# Patient Record
Sex: FEMALE | Race: WHITE | NOT HISPANIC OR LATINO | Employment: UNEMPLOYED | URBAN - METROPOLITAN AREA
[De-identification: names, ages, dates, MRNs, and addresses within clinical notes are randomized per-mention and may not be internally consistent; named-entity substitution may affect disease eponyms.]

---

## 2017-06-14 ENCOUNTER — HOSPITAL ENCOUNTER (INPATIENT)
Facility: MEDICAL CENTER | Age: 32
LOS: 2 days | End: 2017-06-16
Attending: SPECIALIST | Admitting: SPECIALIST
Payer: MEDICAID

## 2017-06-14 LAB
ABO GROUP BLD: NORMAL
ACTION RH IMMUNE GLOB 8505RHG: NORMAL
BASOPHILS # BLD AUTO: 0.6 % (ref 0–1.8)
BASOPHILS # BLD: 0.07 K/UL (ref 0–0.12)
BLD GP AB SCN SERPL QL: NORMAL
EOSINOPHIL # BLD AUTO: 0.08 K/UL (ref 0–0.51)
EOSINOPHIL NFR BLD: 0.6 % (ref 0–6.9)
ERYTHROCYTE [DISTWIDTH] IN BLOOD BY AUTOMATED COUNT: 40.3 FL (ref 35.9–50)
ERYTHROCYTE [DISTWIDTH] IN BLOOD BY AUTOMATED COUNT: 40.6 FL (ref 35.9–50)
HBV SURFACE AG SER QL: NEGATIVE
HCT VFR BLD AUTO: 35.9 % (ref 37–47)
HCT VFR BLD AUTO: 37.5 % (ref 37–47)
HCV AB SER QL: REACTIVE
HGB BLD-MCNC: 12.5 G/DL (ref 12–16)
HGB BLD-MCNC: 12.8 G/DL (ref 12–16)
HIV 1+2 AB+HIV1 P24 AG SERPL QL IA: NON REACTIVE
IMM GRANULOCYTES # BLD AUTO: 0.04 K/UL (ref 0–0.11)
IMM GRANULOCYTES NFR BLD AUTO: 0.3 % (ref 0–0.9)
IMMUNE ROSETTING TEST 8505FMH: NORMAL
LYMPHOCYTES # BLD AUTO: 2.46 K/UL (ref 1–4.8)
LYMPHOCYTES NFR BLD: 19.3 % (ref 22–41)
MCH RBC QN AUTO: 31.4 PG (ref 27–33)
MCH RBC QN AUTO: 31.8 PG (ref 27–33)
MCHC RBC AUTO-ENTMCNC: 34.1 G/DL (ref 33.6–35)
MCHC RBC AUTO-ENTMCNC: 34.8 G/DL (ref 33.6–35)
MCV RBC AUTO: 91.3 FL (ref 81.4–97.8)
MCV RBC AUTO: 91.9 FL (ref 81.4–97.8)
MONOCYTES # BLD AUTO: 0.85 K/UL (ref 0–0.85)
MONOCYTES NFR BLD AUTO: 6.7 % (ref 0–13.4)
NEUTROPHILS # BLD AUTO: 9.22 K/UL (ref 2–7.15)
NEUTROPHILS NFR BLD: 72.5 % (ref 44–72)
NRBC # BLD AUTO: 0.02 K/UL
NRBC BLD AUTO-RTO: 0.2 /100 WBC
NUMBER OF RH DOSES IND 8505RD: 1
PLATELET # BLD AUTO: 172 K/UL (ref 164–446)
PLATELET # BLD AUTO: 185 K/UL (ref 164–446)
PMV BLD AUTO: 11.4 FL (ref 9–12.9)
PMV BLD AUTO: 11.4 FL (ref 9–12.9)
RBC # BLD AUTO: 3.93 M/UL (ref 4.2–5.4)
RBC # BLD AUTO: 4.08 M/UL (ref 4.2–5.4)
RH BLD: NORMAL
RUBV AB SER QL: 59.4 IU/ML
TREPONEMA PALLIDUM IGG+IGM AB [PRESENCE] IN SERUM OR PLASMA BY IMMUNOASSAY: NON REACTIVE
WBC # BLD AUTO: 12.7 K/UL (ref 4.8–10.8)
WBC # BLD AUTO: 14.6 K/UL (ref 4.8–10.8)

## 2017-06-14 PROCEDURE — 86901 BLOOD TYPING SEROLOGIC RH(D): CPT

## 2017-06-14 PROCEDURE — 3E0234Z INTRODUCTION OF SERUM, TOXOID AND VACCINE INTO MUSCLE, PERCUTANEOUS APPROACH: ICD-10-PCS | Performed by: SPECIALIST

## 2017-06-14 PROCEDURE — 700102 HCHG RX REV CODE 250 W/ 637 OVERRIDE(OP): Performed by: SPECIALIST

## 2017-06-14 PROCEDURE — 770002 HCHG ROOM/CARE - OB PRIVATE (112)

## 2017-06-14 PROCEDURE — 700101 HCHG RX REV CODE 250: Performed by: SPECIALIST

## 2017-06-14 PROCEDURE — A9270 NON-COVERED ITEM OR SERVICE: HCPCS | Performed by: SPECIALIST

## 2017-06-14 PROCEDURE — 700112 HCHG RX REV CODE 229: Performed by: SPECIALIST

## 2017-06-14 PROCEDURE — 304965 HCHG RECOVERY SERVICES

## 2017-06-14 PROCEDURE — 86850 RBC ANTIBODY SCREEN: CPT

## 2017-06-14 PROCEDURE — 86780 TREPONEMA PALLIDUM: CPT

## 2017-06-14 PROCEDURE — 700111 HCHG RX REV CODE 636 W/ 250 OVERRIDE (IP): Performed by: SPECIALIST

## 2017-06-14 PROCEDURE — 0HQ9XZZ REPAIR PERINEUM SKIN, EXTERNAL APPROACH: ICD-10-PCS | Performed by: SPECIALIST

## 2017-06-14 PROCEDURE — 87340 HEPATITIS B SURFACE AG IA: CPT

## 2017-06-14 PROCEDURE — 87522 HEPATITIS C REVRS TRNSCRPJ: CPT

## 2017-06-14 PROCEDURE — 85027 COMPLETE CBC AUTOMATED: CPT

## 2017-06-14 PROCEDURE — 59409 OBSTETRICAL CARE: CPT

## 2017-06-14 PROCEDURE — 700105 HCHG RX REV CODE 258

## 2017-06-14 PROCEDURE — 90471 IMMUNIZATION ADMIN: CPT

## 2017-06-14 PROCEDURE — 303615 HCHG EPIDURAL/SPINAL ANESTHESIA FOR LABOR

## 2017-06-14 PROCEDURE — 86803 HEPATITIS C AB TEST: CPT

## 2017-06-14 PROCEDURE — 700111 HCHG RX REV CODE 636 W/ 250 OVERRIDE (IP)

## 2017-06-14 PROCEDURE — 700112 HCHG RX REV CODE 229

## 2017-06-14 PROCEDURE — 86762 RUBELLA ANTIBODY: CPT

## 2017-06-14 PROCEDURE — 87389 HIV-1 AG W/HIV-1&-2 AB AG IA: CPT

## 2017-06-14 PROCEDURE — 85025 COMPLETE CBC W/AUTO DIFF WBC: CPT

## 2017-06-14 PROCEDURE — 86900 BLOOD TYPING SEROLOGIC ABO: CPT

## 2017-06-14 PROCEDURE — 90715 TDAP VACCINE 7 YRS/> IM: CPT

## 2017-06-14 PROCEDURE — 36415 COLL VENOUS BLD VENIPUNCTURE: CPT

## 2017-06-14 PROCEDURE — 85461 HEMOGLOBIN FETAL: CPT

## 2017-06-14 RX ORDER — SODIUM CHLORIDE, SODIUM LACTATE, POTASSIUM CHLORIDE, CALCIUM CHLORIDE 600; 310; 30; 20 MG/100ML; MG/100ML; MG/100ML; MG/100ML
INJECTION, SOLUTION INTRAVENOUS
Status: COMPLETED
Start: 2017-06-14 | End: 2017-06-14

## 2017-06-14 RX ORDER — VITAMIN A ACETATE, BETA CAROTENE, ASCORBIC ACID, CHOLECALCIFEROL, .ALPHA.-TOCOPHEROL ACETATE, DL-, THIAMINE MONONITRATE, RIBOFLAVIN, NIACINAMIDE, PYRIDOXINE HYDROCHLORIDE, FOLIC ACID, CYANOCOBALAMIN, CALCIUM CARBONATE, FERROUS FUMARATE, ZINC OXIDE, CUPRIC OXIDE 3080; 12; 120; 400; 1; 1.84; 3; 20; 22; 920; 25; 200; 27; 10; 2 [IU]/1; UG/1; MG/1; [IU]/1; MG/1; MG/1; MG/1; MG/1; MG/1; [IU]/1; MG/1; MG/1; MG/1; MG/1; MG/1
1 TABLET, FILM COATED ORAL EVERY MORNING
Status: DISCONTINUED | OUTPATIENT
Start: 2017-06-14 | End: 2017-06-16 | Stop reason: HOSPADM

## 2017-06-14 RX ORDER — ONDANSETRON 4 MG/1
4 TABLET, ORALLY DISINTEGRATING ORAL EVERY 6 HOURS PRN
Status: DISCONTINUED | OUTPATIENT
Start: 2017-06-14 | End: 2017-06-16 | Stop reason: HOSPADM

## 2017-06-14 RX ORDER — MISOPROSTOL 200 UG/1
600 TABLET ORAL
Status: DISCONTINUED | OUTPATIENT
Start: 2017-06-14 | End: 2017-06-16 | Stop reason: HOSPADM

## 2017-06-14 RX ORDER — SODIUM CHLORIDE, SODIUM LACTATE, POTASSIUM CHLORIDE, CALCIUM CHLORIDE 600; 310; 30; 20 MG/100ML; MG/100ML; MG/100ML; MG/100ML
INJECTION, SOLUTION INTRAVENOUS CONTINUOUS
Status: DISCONTINUED | OUTPATIENT
Start: 2017-06-14 | End: 2017-06-14 | Stop reason: HOSPADM

## 2017-06-14 RX ORDER — HYDROCODONE BITARTRATE AND ACETAMINOPHEN 5; 325 MG/1; MG/1
2 TABLET ORAL EVERY 4 HOURS PRN
Status: DISCONTINUED | OUTPATIENT
Start: 2017-06-14 | End: 2017-06-16 | Stop reason: HOSPADM

## 2017-06-14 RX ORDER — CLINDAMYCIN PHOSPHATE 900 MG/50ML
900 INJECTION, SOLUTION INTRAVENOUS EVERY 8 HOURS
Status: DISCONTINUED | OUTPATIENT
Start: 2017-06-14 | End: 2017-06-14 | Stop reason: HOSPADM

## 2017-06-14 RX ORDER — SODIUM CHLORIDE, SODIUM LACTATE, POTASSIUM CHLORIDE, CALCIUM CHLORIDE 600; 310; 30; 20 MG/100ML; MG/100ML; MG/100ML; MG/100ML
INJECTION, SOLUTION INTRAVENOUS PRN
Status: DISCONTINUED | OUTPATIENT
Start: 2017-06-14 | End: 2017-06-16 | Stop reason: HOSPADM

## 2017-06-14 RX ORDER — ROPIVACAINE HYDROCHLORIDE 2 MG/ML
INJECTION, SOLUTION EPIDURAL; INFILTRATION; PERINEURAL
Status: COMPLETED
Start: 2017-06-14 | End: 2017-06-14

## 2017-06-14 RX ORDER — HYDROCODONE BITARTRATE AND ACETAMINOPHEN 5; 325 MG/1; MG/1
1 TABLET ORAL EVERY 4 HOURS PRN
Status: DISCONTINUED | OUTPATIENT
Start: 2017-06-14 | End: 2017-06-16 | Stop reason: HOSPADM

## 2017-06-14 RX ORDER — DOCUSATE SODIUM 100 MG/1
100 CAPSULE, LIQUID FILLED ORAL 2 TIMES DAILY PRN
Status: DISCONTINUED | OUTPATIENT
Start: 2017-06-14 | End: 2017-06-16 | Stop reason: HOSPADM

## 2017-06-14 RX ORDER — ONDANSETRON 2 MG/ML
4 INJECTION INTRAMUSCULAR; INTRAVENOUS EVERY 6 HOURS PRN
Status: DISCONTINUED | OUTPATIENT
Start: 2017-06-14 | End: 2017-06-16 | Stop reason: HOSPADM

## 2017-06-14 RX ORDER — METHADONE HYDROCHLORIDE 40 MG/1
60 TABLET ORAL DAILY
Status: DISCONTINUED | OUTPATIENT
Start: 2017-06-14 | End: 2017-06-16 | Stop reason: HOSPADM

## 2017-06-14 RX ORDER — DEXTROSE, SODIUM CHLORIDE, SODIUM LACTATE, POTASSIUM CHLORIDE, AND CALCIUM CHLORIDE 5; .6; .31; .03; .02 G/100ML; G/100ML; G/100ML; G/100ML; G/100ML
INJECTION, SOLUTION INTRAVENOUS CONTINUOUS
Status: DISCONTINUED | OUTPATIENT
Start: 2017-06-14 | End: 2017-06-14 | Stop reason: HOSPADM

## 2017-06-14 RX ORDER — BISACODYL 10 MG
10 SUPPOSITORY, RECTAL RECTAL PRN
Status: DISCONTINUED | OUTPATIENT
Start: 2017-06-14 | End: 2017-06-16 | Stop reason: HOSPADM

## 2017-06-14 RX ORDER — MISOPROSTOL 200 UG/1
800 TABLET ORAL
Status: DISCONTINUED | OUTPATIENT
Start: 2017-06-14 | End: 2017-06-14 | Stop reason: HOSPADM

## 2017-06-14 RX ADMIN — HYDROCODONE BITARTRATE AND ACETAMINOPHEN 2 TABLET: 5; 325 TABLET ORAL at 08:29

## 2017-06-14 RX ADMIN — DOCUSATE SODIUM 100 MG: 100 CAPSULE ORAL at 20:33

## 2017-06-14 RX ADMIN — Medication 125 ML/HR: at 08:30

## 2017-06-14 RX ADMIN — CLINDAMYCIN IN 5 PERCENT DEXTROSE 900 MG: 18 INJECTION, SOLUTION INTRAVENOUS at 05:26

## 2017-06-14 RX ADMIN — FENTANYL CITRATE 100 MCG: 50 INJECTION, SOLUTION INTRAMUSCULAR; INTRAVENOUS at 05:34

## 2017-06-14 RX ADMIN — SODIUM CHLORIDE, POTASSIUM CHLORIDE, SODIUM LACTATE AND CALCIUM CHLORIDE 1000 ML: 600; 310; 30; 20 INJECTION, SOLUTION INTRAVENOUS at 05:24

## 2017-06-14 RX ADMIN — FENTANYL CITRATE 100 MCG: 50 INJECTION, SOLUTION INTRAMUSCULAR; INTRAVENOUS at 04:33

## 2017-06-14 RX ADMIN — TETANUS TOXOID, REDUCED DIPHTHERIA TOXOID AND ACELLULAR PERTUSSIS VACCINE, ADSORBED 0.5 ML: 5; 2.5; 8; 8; 2.5 SUSPENSION INTRAMUSCULAR at 13:44

## 2017-06-14 RX ADMIN — METHADONE HYDROCHLORIDE 60 MG: 40 TABLET ORAL at 12:54

## 2017-06-14 RX ADMIN — HYDROCODONE BITARTRATE AND ACETAMINOPHEN 2 TABLET: 5; 325 TABLET ORAL at 12:28

## 2017-06-14 RX ADMIN — HYDROCODONE BITARTRATE AND ACETAMINOPHEN 1 TABLET: 5; 325 TABLET ORAL at 20:33

## 2017-06-14 RX ADMIN — SODIUM CHLORIDE, SODIUM LACTATE, POTASSIUM CHLORIDE, CALCIUM CHLORIDE 1000 ML: 600; 310; 30; 20 INJECTION, SOLUTION INTRAVENOUS at 05:24

## 2017-06-14 RX ADMIN — ROPIVACAINE HYDROCHLORIDE 200 MG: 2 INJECTION, SOLUTION EPIDURAL; INFILTRATION at 06:09

## 2017-06-14 ASSESSMENT — LIFESTYLE VARIABLES
EVER_SMOKED: YES
ALCOHOL_USE: NO
DO YOU DRINK ALCOHOL: NO

## 2017-06-14 ASSESSMENT — PAIN SCALES - GENERAL
PAINLEVEL_OUTOF10: 5
PAINLEVEL_OUTOF10: 3
PAINLEVEL_OUTOF10: 7
PAINLEVEL_OUTOF10: 3
PAINLEVEL_OUTOF10: 7
PAINLEVEL_OUTOF10: 3

## 2017-06-14 NOTE — CARE PLAN
Problem: Altered physiologic condition related to immediate post-delivery state and potential for bleeding/hemorrhage  Goal: Patient physiologically stable as evidenced by normal lochia, palpable uterine involution and vital signs within normal limits  Outcome: PROGRESSING AS EXPECTED  Lochia wnl and fundus firm.     Problem: Potential knowledge deficit related to lack of understanding of self and  care  Goal: Patient will verbalize understanding of self and infant care  Outcome: PROGRESSING AS EXPECTED  Pt verbalizing understanding of self and infant - will continue to educate.

## 2017-06-14 NOTE — OR SURGEON
Immediate Post-Operative Note          Estimated Blood Loss: 300ccs    Findings:  over left labial laceration without any nuchal cord with easy delivery of the shoulders and body with the placenta spontaneous and intact with 3vc with Apgars of 8/9 at one and five min respectively. Repair done with 4.0Vicryl with single interrupted sutures.    Complications: none        2017 6:48 AM Reid Dominguez

## 2017-06-14 NOTE — DELIVERY NOTE
DATE OF SERVICE:  2017    In brief, this is a 31-year-old  2, para 1, at term with overall   reassuring fetal status with chronic drug use during her pregnancy.  She is a   chronic smoker, has been noncompliant with regard to drawing her laboratory   studies as ordered.  Group B strep status is unknown, did receive 1 dose of   clindamycin, was granted continuous lumbar epidural to optimize pain   management, had a spontaneous rupture of membranes with clear amniotic fluid   noted.  Subsequently, underwent spontaneous vaginal delivery.  Apgars were 8   and 9 at 1 and 5 minutes respectively.  She had a left labial laceration.  She   had easy delivery of the shoulders and body without any nuchal cord.  The   cord was clamped and cut.  Infant was handed to waiting nursing staff.    Placenta was spontaneous and intact.  3-vessel cord was noted.  Estimated   blood loss for the delivery was 300 mL.  Repair of laceration was done with   4-0 Vicryl single interrupted sutures.  Patient tolerated labor and delivery   and repair well.       ____________________________________     MD ELENITA HOFFMAN / NTS    DD:  2017 06:48:11  DT:  2017 06:55:24    D#:  [id number]  Job#:  [id number]

## 2017-06-14 NOTE — IP AVS SNAPSHOT
SkyFuel Access Code: [email or web address]  Expires: 7/16/2017 11:02 AM    SkyFuel  A secure, online tool to manage your health information     [employer]’s SkyFuel® is a secure, online tool that connects you to your personalized health information from the privacy of your home -- day or night - making it very easy for you to manage your healthcare. Once the activation process is completed, you can even access your medical information using the SkyFuel arely, which is available for free in the Apple Arely store or Google Play store.     SkyFuel provides the following levels of access (as shown below):   My Chart Features   [facility] Primary Care Doctor [facility]  Specialists [facility]  Urgent  Care Non-[facility]  Primary Care  Doctor   Email your healthcare team securely and privately 24/7 X X X X   Manage appointments: schedule your next appointment; view details of past/upcoming appointments X      Request prescription refills. X      View recent personal medical records, including lab and immunizations X X X X   View health record, including health history, allergies, medications X X X X   Read reports about your outpatient visits, procedures, consult and ER notes X X X X   See your discharge summary, which is a recap of your hospital and/or ER visit that includes your diagnosis, lab results, and care plan. X X       How to register for SkyFuel:  1. Go to  [email or web address].  2. Click on the Sign Up Now box, which takes you to the New Member Sign Up page. You will need to provide the following information:  a. Enter your SkyFuel Access Code exactly as it appears at the top of this page. (You will not need to use this code after you’ve completed the sign-up process. If you do not sign up before the expiration date, you must request a new code.)   b. Enter your date of birth.   c. Enter your home email address.   d. Click Submit, and follow the next screen’s instructions.  3. Create a SkyFuel ID. This will be your SkyFuel  login ID and cannot be changed, so think of one that is secure and easy to remember.  4. Create a [employer] password. You can change your password at any time.  5. Enter your Password Reset Question and Answer. This can be used at a later time if you forget your password.   6. Enter your e-mail address. This allows you to receive e-mail notifications when new information is available in [employer].  7. Click Sign Up. You can now view your health information.    For assistance activating your [employer] account, call [phone]

## 2017-06-14 NOTE — IP AVS SNAPSHOT
6/16/2017    Minnie Sherman  [id number] [place] [address]    Dear Minnie:    [facility] wants to ensure your discharge home is safe and you or your loved ones have had all of your questions answered regarding your care after you leave the hospital.    Below is a list of resources and contact information should you have any questions regarding your hospital stay, follow-up instructions, or active medical symptoms.    Questions or Concerns Regarding… Contact   Medical Questions Related to Your Discharge  (7 days a week, 8am-5pm) Contact a Nurse Care Coordinator   [phone]   Medical Questions Not Related to Your Discharge  (24 hours a day / 7 days a week)  Contact the Nurse Health Line   [phone]    Medications or Discharge Instructions Refer to your discharge packet   or contact your [facility] Primary Care Provider   [phone]   Follow-up Appointment(s) Schedule your appointment via Genoom   or contact Scheduling [phone]   Billing Review your statement via Genoom  or contact Billing [phone]   Medical Records Review your records via Genoom   or contact Medical Records [phone]     You may receive a telephone call within two days of discharge. This call is to make certain you understand your discharge instructions and have the opportunity to have any questions answered. You can also easily access your medical information, test results and upcoming appointments via the Genoom free online health management tool. You can learn more and sign up at [address]/Genoom. For assistance setting up your Genoom account, please call [phone].    Once again, we want to ensure your discharge home is safe and that you have a clear understanding of any next steps in your care. If you have any questions or concerns, please do not hesitate to contact us, we are here for you. Thank you for choosing [facility] for your healthcare needs.    Sincerely,    Your [facility] Healthcare Team

## 2017-06-14 NOTE — PROGRESS NOTES
Report received. Assessment done - VSS. Pt oriented to rm and skylight. Plan of care discussed. Call light placed within reach. Pt instructed to call before ambulation. All questions answered. Bleeding light, fundus firm.      Infant in the NBN.

## 2017-06-14 NOTE — H&P
DATE OF ADMISSION:  2017    HISTORY OF PRESENT ILLNESS:  Briefly, this is a 31-year-old  2, para 1   at 38 and 3/7 weeks gestation with poor compliance with regard to prenatal   care.  Patient did not get any of her labs done, was noncompliant with visits   and followup and had not been seen since 2017, who presented to labor   and delivery with complaints of frequent regular painful uterine contractions,   had a bulging bag of water, was 7-8, complete, 0 station and was subsequently   admitted.    PAST MEDICAL HISTORY:  Noncontributory.    PAST SURGICAL HISTORY:  None.    OBSTETRICAL HISTORY:  Patient had 1 previous delivery, 6-pound infant, this is   her 2nd pregnancy.    SOCIAL HISTORY:  She does report significant drug use during the course of   this entire pregnancy with the last episode of drug use 1 week ago, for which   she used meth, amphetamines and heroin.  She has been a smoker throughout her   entire pregnancy.  She states that she has recently been checked into a rehab   clinic and states she is on methadone; however, there is no documentation for   this recent presentation to rehab.    MEDICATIONS:  None.    ALLERGIES:  PENICILLIN.    PHYSICAL EXAMINATION:  VITAL SIGNS:  She is afebrile, hemodynamically stable.  HEART:  Regular rate and rhythm.  CHEST:  Clear to auscultation bilaterally.  ABDOMEN:  Soft, gravid, nontender.  PELVIC:  Sterile vaginal exam is as stated above.  EXTREMITIES:  Nontender.    LABORATORY DATA:  Prenatal care labs have not been drawn.  Her group B strep   status is unknown.    ASSESSMENT AND PLAN:  A 31-year-old  2, para 1 at term with overall   reassuring fetal status with chronic drug use during the course of this   pregnancy, she is a long-term smoker of tobacco.  She also has been   noncompliant with followup and establishment with care, drawing her prenatal   care labs, her group B strep status is unknown.  We will now start clindamycin   given  her penicillin allergy and draw prenatal care labs.  We will also   perform a tox screen.  Overall, reassuring fetal status is appreciated.       ____________________________________     MD ELENITA HOFFMAN / JEREMY    DD:  06/14/2017 06:46:38  DT:  06/14/2017 06:57:53    D#:  [id number]  Job#:  [id number]

## 2017-06-14 NOTE — CARE PLAN
Problem: Knowledge Deficit  Goal: Patient/Support person demonstrates understanding regarding the progression of labor, available options and participates in decision-making process  Intervention: Instruct patient/support person in basic interpretation of fetal monitor  Discussed basics of fetal monitor with pt.       Problem: Pain  Goal: Alleviation of Pain or a reduction in pain to the patient’s comfort goal  Intervention: Pain Management - Epidural/Spinal  Discussed pain management options and epidural with pt.

## 2017-06-14 NOTE — IP AVS SNAPSHOT
Home Care Instructions                                                                                                                Minnie Sherman   MRN: [id number]    Department:  POST PARTUM 31   2017           Follow-up Information     1. Follow up with Reid Dominguez M.D.. Schedule an appointment as soon as possible for a visit in 6 weeks.    Specialty:  OB/Gyn    Contact information    [address]  [place] [address]  [phone]         I assume responsibility for securing a follow-up  Screening blood test on my baby within the specified date range.    -                  Discharge Instructions       POSTPARTUM DISCHARGE INSTRUCTIONS FOR MOM    YOB: 1985   Age: 31 y.o.               Admit Date: 2017     Discharge Date: 2017  Attending Doctor:  Reid Dominguez M.D.                  Allergies:  Aspirin    Discharged to home by car. Discharged via wheelchair, hospital escort: Yes.  Special equipment needed: Not Applicable  Belongings with: Personal  Be sure to schedule a follow-up appointment with your primary care doctor or any specialists as instructed.     Discharge Plan:   Smoking Cessation Offered: Patient Refused  Influenza Vaccine Indication: Indicated: Not available from distributor/    REASONS TO CALL YOUR OBSTETRICIAN:  1.   Persistent fever or shaking chills (Temperature higher than 100.4)  2.   Heavy bleeding (soaking more than 1 pad per hour); Passing clots  3.   Foul odor from vagina  4.   Mastitis (Breast infection; breast pain, chills, fever, redness)  5.   Urinary pain, burning or frequency  6.   Episiotomy infection  7.   Abdominal incision infection  8.   Severe depression longer than 24 hours    HAND WASHING  · Prior to handling the baby.  · Before breastfeeding or bottle feeding baby.  · After using the bathroom or changing the baby's diaper.    VAGINAL CARE  · Nothing inside vagina for 6 weeks: no sexual intercourse, tampons or  "douching.  · Bleeding may continue for 2-4 weeks.  Amount may vary.    · Call your physician for heavy bleeding which means soaking more than 1 pad per hour    BIRTH CONTROL  · It is possible to become pregnant at any time after delivery and while breastfeeding.  · Plan to discuss a method of birth control with your physician at your follow up visit. visit.    DIET AND ELIMINATION  · Eating more fiber (bran cereal, fruits, and vegetables) and drinking plenty of fluids will help to avoid constipation.  · Urinary frequency after childbirth is normal.    POSTPARTUM BLUES  During the first few days after birth, you may experience a sense of the \"blues\" which may include impatience, irritability or even crying.  These feeling come and go quickly.  However, as many as 1 in 10 women experience emotional symptoms known as postpartum depression.    Postpartum depression:  May start as early as the second or third day after delivery or take several weeks or months to develop.  Symptoms of \"blues\" are present, but are more intense:  Crying spells; loss of appetite; feelings of hopelessness or loss of control; fear of touching the baby; over concern or no concern at all about the baby; little or no concern about your own appearance/caring for yourself; and/or inability to sleep or excessive sleeping.  Contact your physician if you are experiencing any of these symptoms.    Crisis Hotline:  · [place] Crisis Hotline:  4-322-VURDRVU  Or 1-483.570.5896  · [place] Crisis Hotline:  1-675.775.6928  Or 917-030-9888    PREVENTING SHAKEN BABY:  If you are angry or stressed, PUT THE BABY IN THE CRIB, step away, take some deep breaths, and wait until you are calm to care for the baby.  DO NOT SHAKE THE BABY.  You are not alone, call a supporter for help.    · Crisis Call Center 24/7 crisis line 061-371-9788 or 1-478.659.3883  · You can also text them, text \"ANSWER\" to 601390    QUIT SMOKING/TOBACCO USE:  I understand the use of any tobacco " products increases my chance of suffering from future heart disease and could cause other illnesses which may shorten my life. Quitting the use of tobacco products is the single most important thing I can do to improve my health. For further information on smoking / tobacco cessation call a Toll Free Quit Line at 1-727.142.5885 (*[employer] [place]) or 1-713.381.2681 (American Lung Association) or you can access the web based program at www.lungusa.org.    · [place] Tobacco Users Help Line:  (412) 695-7292       Toll Free: 1-517.581.2637  · Quit Tobacco Program [facility] (099)539-3007    DEPRESSION / SUICIDE RISK:  As you are discharged from this [facility], it is important to learn how to keep safe from harming yourself.    Recognize the warning signs:  · Abrupt changes in personality, positive or negative- including increase in energy   · Giving away possessions  · Change in eating patterns- significant weight changes-  positive or negative  · Change in sleeping patterns- unable to sleep or sleeping all the time   · Unwillingness or inability to communicate  · Depression  · Unusual sadness, discouragement and loneliness  · Talk of wanting to die  · Neglect of personal appearance   · Rebelliousness- reckless behavior  · Withdrawal from people/activities they love  · Confusion- inability to concentrate     If you or a loved one observes any of these behaviors or has concerns about self-harm, here's what you can do:  · Talk about it- your feelings and reasons for harming yourself  · Remove any means that you might use to hurt yourself (examples: pills, rope, extension cords, firearm)  · Get professional help from the community (Mental Health, Substance Abuse, psychological counseling)  · Do not be alone:Call your Safe Contact- someone whom you trust who will be there for you.  · Call your local CRISIS HOTLINE 001-4329 or 272-193-7254  · Call your local Children's Mobile  Crisis Response Team [place] (199) 432-3877 or www.BankBazaar.com  · Call the toll free National Suicide Prevention Hotlines   · National Suicide Prevention Lifeline 525-835-BENF (9290)  · National Hope Line Network 800-SUICIDE (257-7675)    DISCHARGE SURVEY:  Thank you for choosing [facility].  We hope we provided you with very good care.  You may be receiving a survey in the mail.  Please fill it out.  Your opinion is valuable to us.    ADDITIONAL EDUCATIONAL MATERIALS GIVEN TO PATIENT:        My signature on this form indicates that:  1.  I have reviewed and understand the above information  2.  My questions regarding this information have been answered to my satisfaction.  3.  I have formulated a plan with my discharge nurse to obtain my prescribed medication for home.         Discharge Medication Instructions:    Below are the medications your physician expects you to take upon discharge:    Review all your home medications and newly ordered medications with your doctor and/or pharmacist. Follow medication instructions as directed by your doctor and/or pharmacist.    Please keep your medication list with you and share with your physician.               Medication List      START taking these medications        Instructions    Morning Afternoon Evening Bedtime    hydrocodone-acetaminophen 5-325 MG Tabs per tablet   Last time this was given:  2 Tabs on 6/16/2017  8:19 AM   Commonly known as:  NORCO        Take 1-2 Tabs by mouth every four hours as needed (for Moderate Pain (Pain Scale 4-6) after delivery).   Dose:  1-2 Tab                          CONTINUE taking these medications        Instructions    Morning Afternoon Evening Bedtime    COMPLETE PRENATAL/DHA 30-0.975 & 300 MG Misc        Take 30 mg by mouth every day at 6 PM.   Dose:  30 mg                        ibuprofen 800 MG Tabs   Commonly known as:  MOTRIN        Take 1 Tab by mouth every 8 hours as needed.   Dose:  800 mg                         PRENATAL 1 PO        Take  by mouth.                        tramadol 50 MG Tabs   Commonly known as:  ULTRAM        Take 1-2 Tabs by mouth every four hours as needed for Moderate Pain or Severe Pain.   Dose:   mg                             Where to Get Your Medications      Information about where to get these medications is not yet available     ! Ask your nurse or doctor about these medications    - hydrocodone-acetaminophen 5-325 MG Tabs per tablet  - ibuprofen 800 MG Tabs            Crisis Hotline:     [place] Crisis Hotline:  3-640-UWMWSIA or 1-584.493.2344    [place] Crisis Hotline:    1-666.842.8485 or 190-465-5363        Disclaimer           _____________________________________                     __________       ________       Patient/Mother Signature or Legal                          Date                   Time

## 2017-06-14 NOTE — PROGRESS NOTES
Pt arrived by [facility] c/o of cxs since 020, -VB, -LOF, and +FM. SVE. Dr Dominguez notified with orders to admit pt. Pt requesting epidural. Dr Dominguez at bedside at 0620. SVE at complete. SROM at 0633.  at 0635. Viable female with 8/9 apgars. Pt was firm/light. BS report given to LEXI Prieto RN.

## 2017-06-14 NOTE — PROGRESS NOTES
IV infiltrated with 400cc of the second bag of Pitocin still in the bag. Pt's lochia is light and fundus is firm. Dr. Dominguez notified, more Pitocin is not necessary per MD order.

## 2017-06-15 PROCEDURE — 770002 HCHG ROOM/CARE - OB PRIVATE (112)

## 2017-06-15 PROCEDURE — 700112 HCHG RX REV CODE 229: Performed by: SPECIALIST

## 2017-06-15 PROCEDURE — 700102 HCHG RX REV CODE 250 W/ 637 OVERRIDE(OP): Performed by: SPECIALIST

## 2017-06-15 PROCEDURE — A9270 NON-COVERED ITEM OR SERVICE: HCPCS | Performed by: SPECIALIST

## 2017-06-15 RX ADMIN — HYDROCODONE BITARTRATE AND ACETAMINOPHEN 2 TABLET: 5; 325 TABLET ORAL at 16:03

## 2017-06-15 RX ADMIN — HYDROCODONE BITARTRATE AND ACETAMINOPHEN 2 TABLET: 5; 325 TABLET ORAL at 21:23

## 2017-06-15 RX ADMIN — HYDROCODONE BITARTRATE AND ACETAMINOPHEN 1 TABLET: 5; 325 TABLET ORAL at 00:46

## 2017-06-15 RX ADMIN — METHADONE HYDROCHLORIDE 60 MG: 40 TABLET ORAL at 08:32

## 2017-06-15 RX ADMIN — DOCUSATE SODIUM 100 MG: 100 CAPSULE ORAL at 16:03

## 2017-06-15 RX ADMIN — Medication 1 TABLET: at 08:32

## 2017-06-15 RX ADMIN — HYDROCODONE BITARTRATE AND ACETAMINOPHEN 2 TABLET: 5; 325 TABLET ORAL at 11:09

## 2017-06-15 RX ADMIN — HYDROCODONE BITARTRATE AND ACETAMINOPHEN 2 TABLET: 5; 325 TABLET ORAL at 06:17

## 2017-06-15 ASSESSMENT — PAIN SCALES - GENERAL
PAINLEVEL_OUTOF10: 6
PAINLEVEL_OUTOF10: 6
PAINLEVEL_OUTOF10: 7
PAINLEVEL_OUTOF10: 6
PAINLEVEL_OUTOF10: 3
PAINLEVEL_OUTOF10: 7
PAINLEVEL_OUTOF10: 3
PAINLEVEL_OUTOF10: 3
PAINLEVEL_OUTOF10: 6
PAINLEVEL_OUTOF10: 3
PAINLEVEL_OUTOF10: 2

## 2017-06-15 NOTE — DISCHARGE PLANNING
:    Infant: Kashif Sherman (: 17)    Referral: Relapsed two weeks ago, FOB in [facility], history of bipolar and borderline personality disorder.    Intervention:  Reviewed medical record and met with MOB, Avelina Sherman who delivered her second child.  Mother has a 10 year old daughter, Juanis Jacob (06) that lives with her paternal grandparents in [place].  Grandparents have custody of Juanis.  The father of this baby is Fili Shahid (88) and he recently went to [facility] and expected to be released soon.  Mother states she lives with AUDREY and two other roommates: Mitesh and Amor.  The address is [address] [place] [address] [place] [address].  House number is [phone] and cell is [phone].  Mother states she is prepared for infant; she has a car seat, clothes, bottles, diapers, swing, and a stroller.  She is receiving Medicaid and food stamps and plans on applying for WIC.  The FOB is employed as a  and MOB said her family and FOB's family assist them financially.  Mother stated two weeks ago she was very upset and depressed and relapsed on meth and heroin.  She contacted her counselor at the [facility] and was told she should go to the methadone clinic.  She went to [facility] and her counselor is Luna.  Infant is positive for methadone and opiates.  Mother was given Fentanyl during labor.  Discussed mother's mental health and she is not seeing anyone or on medication.  Provided MOB with a packet of resources: pediatrician list, children's resource list, WIC application, diaper bank referral, and substance abuse resource.    Notified MOB that [facility] would be notified.  Called CPS and reported information to Alvarado Acevedo.  Infant will remain in the NBN for five days to monitor for withdrawals.      Plan;  Continue to follow and coordinate with [facility].

## 2017-06-15 NOTE — PROGRESS NOTES
1900: Received bedside report from Catarina Mcintyre RN. Whiteboards updated, POC discussed. Patient declines pain at this time will call when needing pain medication. Encouraged to call with any needs and or concerns.     2056: Call placed to Dr. Dominguez regarding patients blood pressure. Awaiting call back.     2130: Dr. Chacon called. Updated given on patient. Received blood pressures parameters call MD for BP's 160/100 systolic or diastolic. Orders for nicotine patch received.     2320: Patient moved to room 303. Report given to MARCO Salcido.

## 2017-06-15 NOTE — CARE PLAN
Problem: Altered physiologic condition related to immediate post-delivery state and potential for bleeding/hemorrhage  Goal: Patient physiologically stable as evidenced by normal lochia, palpable uterine involution and vital signs within normal limits  Outcome: PROGRESSING AS EXPECTED  Fundus firm, light lochia. Patient educated on when to pull emergency call light.     Problem: Potential knowledge deficit related to lack of understanding of self and  care  Goal: Patient will verbalize understanding of self and infant care  Outcome: PROGRESSING AS EXPECTED  Education discussed with patient. No questions. Encouraged to watch skylight videos.

## 2017-06-15 NOTE — CARE PLAN
Problem: Altered physiologic condition related to immediate post-delivery state and potential for bleeding/hemorrhage   Goal: Patient physiologically stable as evidenced by normal lochia, palpable uterine involution and vital signs within normal limits   Intervention: Massage fundus as necessary to prevent excessive lochia   Firm fundal massages done per protocol and scant to light lochia noted.     Problem: Alteration in comfort related to episiotomy, vaginal repair and/or after birth pains   Goal: Patient is able to ambulate, care for self and infant   Pt participating in infant/self care appropriately and has been provided with tucks/spray and frequent ice packs for discomfort.

## 2017-06-15 NOTE — PROGRESS NOTES
POST PARTUM DAY # 1  The patient complains of cramping pain. She complains of soreness.   She says that she feels fine otherwise and she says that she has no other problems or complaints.   She says that she would like to say until tomorrow.   The patient's vital signs are stable and she is afebrile.   She appears well developed and well nourished and relaxed and alert and comfortable and in no apparent distress.   Labs: the patient's hemoglobin went from 12.5 grams per deciliter antepartum to 12.8 grams per deciliter post partum.   We will plan on discharge home tomorrow.   Timo Martinez M.D.

## 2017-06-16 VITALS
BODY MASS INDEX: 26.53 KG/M2 | SYSTOLIC BLOOD PRESSURE: 148 MMHG | OXYGEN SATURATION: 95 % | TEMPERATURE: 97.2 F | HEART RATE: 67 BPM | RESPIRATION RATE: 20 BRPM | WEIGHT: 169 LBS | DIASTOLIC BLOOD PRESSURE: 82 MMHG | HEIGHT: 67 IN

## 2017-06-16 LAB
HCV RNA SERPL NAA+PROBE-ACNC: ABNORMAL IU/ML
HCV RNA SERPL NAA+PROBE-LOG IU: 5.2 LOG IU
HCV RNA SERPL QL NAA+PROBE: DETECTED
PATHOLOGY STUDY: ABNORMAL

## 2017-06-16 PROCEDURE — A9270 NON-COVERED ITEM OR SERVICE: HCPCS | Performed by: SPECIALIST

## 2017-06-16 PROCEDURE — 700102 HCHG RX REV CODE 250 W/ 637 OVERRIDE(OP): Performed by: SPECIALIST

## 2017-06-16 RX ORDER — METHADONE HYDROCHLORIDE 40 MG/1
40 TABLET ORAL ONCE
Status: DISCONTINUED | OUTPATIENT
Start: 2017-06-16 | End: 2017-06-16 | Stop reason: HOSPADM

## 2017-06-16 RX ORDER — IBUPROFEN 800 MG/1
800 TABLET ORAL EVERY 8 HOURS PRN
Qty: 30 TAB | Refills: 0 | Status: SHIPPED | OUTPATIENT
Start: 2017-06-16 | End: 2021-07-09

## 2017-06-16 RX ORDER — HYDROCODONE BITARTRATE AND ACETAMINOPHEN 5; 325 MG/1; MG/1
1-2 TABLET ORAL EVERY 4 HOURS PRN
Qty: 30 TAB | Refills: 0 | Status: SHIPPED | OUTPATIENT
Start: 2017-06-16 | End: 2021-07-09

## 2017-06-16 RX ADMIN — Medication 1 TABLET: at 08:19

## 2017-06-16 RX ADMIN — HYDROCODONE BITARTRATE AND ACETAMINOPHEN 1 TABLET: 5; 325 TABLET ORAL at 01:42

## 2017-06-16 RX ADMIN — HYDROCODONE BITARTRATE AND ACETAMINOPHEN 2 TABLET: 5; 325 TABLET ORAL at 08:19

## 2017-06-16 RX ADMIN — METHADONE HYDROCHLORIDE 60 MG: 40 TABLET ORAL at 08:20

## 2017-06-16 ASSESSMENT — PAIN SCALES - GENERAL
PAINLEVEL_OUTOF10: 6
PAINLEVEL_OUTOF10: 3
PAINLEVEL_OUTOF10: 5
PAINLEVEL_OUTOF10: 7

## 2017-06-16 NOTE — DISCHARGE PLANNING
:    Ongoing:  Received a call from Zara Barnett DSS ([phone]) who was assigned this case.  Zara will be coming to the hospital to meet with mother.  Met with parents and notified them that CPS was on the way.  Infant will remain in the NBN and monitored for withdrawals.  The Saritha scores have been going up.    Plan:  Continue to follow and coordinate with [facility].

## 2017-06-16 NOTE — PROGRESS NOTES
0688-Report received at bedside from Ramila RN, assumed care of patient. Encouraged to call with needs, denies at this time.   0824-Assessment completed, fundus is firm, lochia light and vital signs within defined parameters. Patient is ambulating and voiding without difficulty.  Discussed with patient pain medication plan, patient requesting to be medicated when medications are available, will call for medication.  Plan of care discussed, patient verbalized understanding. Hourly rounding implemented, call light within reach.

## 2017-06-16 NOTE — PROGRESS NOTES
POST PARTUM DAY # 2  The patient says that she feels fine and that she has no problems or complaints.   The patient's vital signs are stable and she is afebrile.   She appears well developed and well nourished and relaxed and alert and comfortable and in no apparent distress.   Will discharge home today.   Timo Matrinez M.D.

## 2017-06-16 NOTE — PROGRESS NOTES
"1900 - Bedside report received from MARCO Jenkins. Patient care assumed.  2050 - This RN noticed that patient left her room and walked off unit. This RN then checked room to see if anyone was with infant; it was found that some andrea was in the room, unknown relationship to patient. Male did not have an armband. This RN explained to him that infant can not be left alone with anyone who is unbanded and explained that infant will be taken to NBN until patient returns and she can pick infant up from the NBN.   2100 - MOB came to pick infant up from NBN during RN's assessment. This RN explained to MOB that infant can't be left alone with anyone who is does not have an armband. MOB asked how he can get an armband. This RN explained to MOB that the banding process is done down in L&D during the delivery process. All questions addressed at this time.  2123 - Patient requesting pain medication. Patient stating her pain is 7/10. This RN observed patient walking off the unit, taking a shower, walking to and from the NBN does not appear to be in any pain. This patient was medicated per her request as based on pain level 7/10 with 2 tabs of Norco 5/325.   2130 - Pt assessment complete, wnl. Fundus firm with minimal discharge. Pt ambulating to bathroom and voiding without difficulty. Patient denies any dizziness or lightheadedness at this time. Patient denies any calf pain or tenderness at this time. Reviewed use of emergency light. Plan of care discussed with patient for the day. Pain medication plan discussed with patient; patient requesting her PRN pain medication ATC and would like to be woken up sleeping. All questions/concerns addressed at this time. All needs met at this time, encouraged to call with needs, will monitor  0130 - Patient called requesting PRN pain medication. Patient states her pain level is 6/10.  0142 - Per pain scale at 6/10, patient medicated with 1 tab of Norco 5/325. Patient then said, \"Can I get the " "other tab?\"  This RN explained to patient that based on her pain level at 6/10, the MD order calls for 1 tablet. This RN offered heat packs. 2 heat packs given to patient.   "

## 2017-06-16 NOTE — DISCHARGE INSTRUCTIONS
POSTPARTUM DISCHARGE INSTRUCTIONS FOR MOM    YOB: 1985   Age: 31 y.o.               Admit Date: 6/14/2017     Discharge Date: 6/16/2017  Attending Doctor:  Reid Dominguez M.D.                  Allergies:  Aspirin    Discharged to home by car. Discharged via wheelchair, hospital escort: Yes.  Special equipment needed: Not Applicable  Belongings with: Personal  Be sure to schedule a follow-up appointment with your primary care doctor or any specialists as instructed.     Discharge Plan:   Smoking Cessation Offered: Patient Refused  Influenza Vaccine Indication: Indicated: Not available from distributor/    REASONS TO CALL YOUR OBSTETRICIAN:  1.   Persistent fever or shaking chills (Temperature higher than 100.4)  2.   Heavy bleeding (soaking more than 1 pad per hour); Passing clots  3.   Foul odor from vagina  4.   Mastitis (Breast infection; breast pain, chills, fever, redness)  5.   Urinary pain, burning or frequency  6.   Episiotomy infection  7.   Abdominal incision infection  8.   Severe depression longer than 24 hours    HAND WASHING  · Prior to handling the baby.  · Before breastfeeding or bottle feeding baby.  · After using the bathroom or changing the baby's diaper.    VAGINAL CARE  · Nothing inside vagina for 6 weeks: no sexual intercourse, tampons or douching.  · Bleeding may continue for 2-4 weeks.  Amount may vary.    · Call your physician for heavy bleeding which means soaking more than 1 pad per hour    BIRTH CONTROL  · It is possible to become pregnant at any time after delivery and while breastfeeding.  · Plan to discuss a method of birth control with your physician at your follow up visit. visit.    DIET AND ELIMINATION  · Eating more fiber (bran cereal, fruits, and vegetables) and drinking plenty of fluids will help to avoid constipation.  · Urinary frequency after childbirth is normal.    POSTPARTUM BLUES  During the first few days after birth, you may experience a sense of  "the \"blues\" which may include impatience, irritability or even crying.  These feeling come and go quickly.  However, as many as 1 in 10 women experience emotional symptoms known as postpartum depression.    Postpartum depression:  May start as early as the second or third day after delivery or take several weeks or months to develop.  Symptoms of \"blues\" are present, but are more intense:  Crying spells; loss of appetite; feelings of hopelessness or loss of control; fear of touching the baby; over concern or no concern at all about the baby; little or no concern about your own appearance/caring for yourself; and/or inability to sleep or excessive sleeping.  Contact your physician if you are experiencing any of these symptoms.    Crisis Hotline:  · [place] Crisis Hotline:  6-113-BELKAYO  Or 1-923.469.1680  · [place] Crisis Hotline:  1-209.301.4982  Or 037-642-2206    PREVENTING SHAKEN BABY:  If you are angry or stressed, PUT THE BABY IN THE CRIB, step away, take some deep breaths, and wait until you are calm to care for the baby.  DO NOT SHAKE THE BABY.  You are not alone, call a supporter for help.    · Crisis Call Center 24/7 crisis line 858-696-7347 or 1-664.713.2734  · You can also text them, text \"ANSWER\" to 680739    QUIT SMOKING/TOBACCO USE:  I understand the use of any tobacco products increases my chance of suffering from future heart disease and could cause other illnesses which may shorten my life. Quitting the use of tobacco products is the single most important thing I can do to improve my health. For further information on smoking / tobacco cessation call a Toll Free Quit Line at 1-931.235.9317 (*[employer] [place]) or 1-797.632.1722 (American Lung Association) or you can access the web based program at www.lungusa.org.    · [place] Tobacco Users Help Line:  (473) 801-9091       Toll Free: 1-372.203.1762  · Quit Tobacco Program [facility] (986)731-4904    DEPRESSION / " SUICIDE RISK:  As you are discharged from this [facility] facility, it is important to learn how to keep safe from harming yourself.    Recognize the warning signs:  · Abrupt changes in personality, positive or negative- including increase in energy   · Giving away possessions  · Change in eating patterns- significant weight changes-  positive or negative  · Change in sleeping patterns- unable to sleep or sleeping all the time   · Unwillingness or inability to communicate  · Depression  · Unusual sadness, discouragement and loneliness  · Talk of wanting to die  · Neglect of personal appearance   · Rebelliousness- reckless behavior  · Withdrawal from people/activities they love  · Confusion- inability to concentrate     If you or a loved one observes any of these behaviors or has concerns about self-harm, here's what you can do:  · Talk about it- your feelings and reasons for harming yourself  · Remove any means that you might use to hurt yourself (examples: pills, rope, extension cords, firearm)  · Get professional help from the community (Mental Health, Substance Abuse, psychological counseling)  · Do not be alone:Call your Safe Contact- someone whom you trust who will be there for you.  · Call your local CRISIS HOTLINE 848-8034 or 001-215-0319  · Call your local Children's Mobile Crisis Response Team [place] (581) 193-6463 or www.Gamblino  · Call the toll free National Suicide Prevention Hotlines   · National Suicide Prevention Lifeline 151-938-ALKS (2125)  · National Hope Line Network 800-SUICIDE (757-4893)    DISCHARGE SURVEY:  Thank you for choosing [facility].  We hope we provided you with very good care.  You may be receiving a survey in the mail.  Please fill it out.  Your opinion is valuable to us.    ADDITIONAL EDUCATIONAL MATERIALS GIVEN TO PATIENT:        My signature on this form indicates that:  1.  I have reviewed and understand the above information  2.  My questions regarding  this information have been answered to my satisfaction.  3.  I have formulated a plan with my discharge nurse to obtain my prescribed medication for home.

## 2017-06-16 NOTE — CARE PLAN
Problem: Altered physiologic condition related to immediate post-delivery state and potential for bleeding/hemorrhage  Goal: Patient physiologically stable as evidenced by normal lochia, palpable uterine involution and vital signs within normal limits  Outcome: PROGRESSING AS EXPECTED  Fundus is firm, lochia is light and vital signs are stable. Will continue to monitor with Q6 hour checks and Q2 hour rounding    Problem: Potential for postpartum infection related to presence of episiotomy/vaginal tear and/or uterine contamination  Goal: Patient will be absent from signs and symptoms of infection  Outcome: PROGRESSING AS EXPECTED  Patient does not exhibit any signs/symptoms of infection. Will continue to monitor with Q6 hour checks and Q2 hour rounding

## 2017-06-17 ENCOUNTER — HOSPITAL ENCOUNTER (EMERGENCY)
Facility: MEDICAL CENTER | Age: 32
End: 2017-06-17
Attending: EMERGENCY MEDICINE
Payer: MEDICAID

## 2017-06-17 VITALS
TEMPERATURE: 98.4 F | SYSTOLIC BLOOD PRESSURE: 152 MMHG | HEART RATE: 68 BPM | RESPIRATION RATE: 16 BRPM | OXYGEN SATURATION: 96 % | BODY MASS INDEX: 23.81 KG/M2 | DIASTOLIC BLOOD PRESSURE: 92 MMHG | WEIGHT: 151.68 LBS | HEIGHT: 67 IN

## 2017-06-17 DIAGNOSIS — F11.20 NARCOTIC DEPENDENCE (HCC): ICD-10-CM

## 2017-06-17 DIAGNOSIS — Z72.0 TOBACCO ABUSE: ICD-10-CM

## 2017-06-17 DIAGNOSIS — K02.9 DENTAL CARIES: ICD-10-CM

## 2017-06-17 PROCEDURE — 99283 EMERGENCY DEPT VISIT LOW MDM: CPT

## 2017-06-17 RX ORDER — AMOXICILLIN 500 MG/1
500 CAPSULE ORAL 3 TIMES DAILY
Qty: 30 CAP | Refills: 0 | Status: SHIPPED | OUTPATIENT
Start: 2017-06-17 | End: 2021-07-09

## 2017-06-17 NOTE — ED NOTES
Chief Complaint   Patient presents with   • Medication Refill     Pt BIB self to triage for c/o needing a med refill. pt reports to need methadone dose because clinic is unable to.     Explained to pt triage process, made pt aware to tell this RN of any changes/concerns, pt verbalized understanding of process and instructions given. Pt to ER criss.

## 2017-06-17 NOTE — ED AVS SNAPSHOT
6/17/2017    Minnie Sherman  [address] [place] [address]    Dear Minnie:    [facility] wants to ensure your discharge home is safe and you or your loved ones have had all of your questions answered regarding your care after you leave the hospital.    Below is a list of resources and contact information should you have any questions regarding your hospital stay, follow-up instructions, or active medical symptoms.    Questions or Concerns Regarding… Contact   Medical Questions Related to Your Discharge  (7 days a week, 8am-5pm) Contact a Nurse Care Coordinator   [phone]   Medical Questions Not Related to Your Discharge  (24 hours a day / 7 days a week)  Contact the Nurse Health Line   [phone]    Medications or Discharge Instructions Refer to your discharge packet   or contact your [facility] Primary Care Provider   [phone]   Follow-up Appointment(s) Schedule your appointment via Tarena   or contact Scheduling [phone]   Billing Review your statement via Tarena  or contact Billing [phone]   Medical Records Review your records via Tarena   or contact Medical Records [phone]     You may receive a telephone call within two days of discharge. This call is to make certain you understand your discharge instructions and have the opportunity to have any questions answered. You can also easily access your medical information, test results and upcoming appointments via the Tarena free online health management tool. You can learn more and sign up at PixSpree/Tarena. For assistance setting up your Tarena account, please call [phone].    Once again, we want to ensure your discharge home is safe and that you have a clear understanding of any next steps in your care. If you have any questions or concerns, please do not hesitate to contact us, we are here for you. Thank you for choosing [facility] for your healthcare needs.    Sincerely,    Your [facility] Healthcare Team

## 2017-06-17 NOTE — DISCHARGE INSTRUCTIONS
See a dentist as soon as possible for further dental evaluation and treatment. The emergency department cannot refill your methadone prescriptions.

## 2017-06-17 NOTE — ED NOTES
Discharge instructions given to pt including follow up w/dentist and verbalized understanding. erp already explained to pt about the policy regarding methadone refill, instructed to see the methadone clinic for refill. No new complaints made. Questions answered by RN.

## 2017-06-17 NOTE — ED AVS SNAPSHOT
Leaguevine Access Code: [email or web address]  Expires: 7/16/2017 11:02 AM    Leaguevine  A secure, online tool to manage your health information     [employer]’s Leaguevine® is a secure, online tool that connects you to your personalized health information from the privacy of your home -- day or night - making it very easy for you to manage your healthcare. Once the activation process is completed, you can even access your medical information using the Leaguevine arely, which is available for free in the Apple Arely store or Google Play store.     Leaguevine provides the following levels of access (as shown below):   My Chart Features   [facility] Primary Care Doctor [facility]  Specialists [facility]  Urgent  Care Non-[facility]  Primary Care  Doctor   Email your healthcare team securely and privately 24/7 X X X X   Manage appointments: schedule your next appointment; view details of past/upcoming appointments X      Request prescription refills. X      View recent personal medical records, including lab and immunizations X X X X   View health record, including health history, allergies, medications X X X X   Read reports about your outpatient visits, procedures, consult and ER notes X X X X   See your discharge summary, which is a recap of your hospital and/or ER visit that includes your diagnosis, lab results, and care plan. X X       How to register for Leaguevine:  1. Go to  [email or web address].  2. Click on the Sign Up Now box, which takes you to the New Member Sign Up page. You will need to provide the following information:  a. Enter your Leaguevine Access Code exactly as it appears at the top of this page. (You will not need to use this code after you’ve completed the sign-up process. If you do not sign up before the expiration date, you must request a new code.)   b. Enter your date of birth.   c. Enter your home email address.   d. Click Submit, and follow the next screen’s instructions.  3. Create a Leaguevine ID. This will be your Leaguevine  login ID and cannot be changed, so think of one that is secure and easy to remember.  4. Create a Wikidata password. You can change your password at any time.  5. Enter your Password Reset Question and Answer. This can be used at a later time if you forget your password.   6. Enter your e-mail address. This allows you to receive e-mail notifications when new information is available in Wikidata.  7. Click Sign Up. You can now view your health information.    For assistance activating your Wikidata account, call [phone]

## 2017-06-17 NOTE — ED PROVIDER NOTES
"ED Provider Note    CHIEF COMPLAINT  Chief Complaint   Patient presents with   • Medication Refill     Pt BIB self to triage for c/o needing a med refill. pt reports to need methadone dose because clinic is unable to.       HPI  Minnie Sherman is a 31 y.o. female who presents to the emergency department requesting a dose of methadone. The patient goes to the methadone clinic and has a history of substance abuse. She has recently been in the hospital and states that she had a baby 3 days ago and she went to her methadone clinic today and there was some sort of problem with their medical records and they could not dispense a dose to her so she has come to the emergency department to request this. In addition the patient says that she would like a prescription for antibiotics for right lower dental pain. The patient says several months ago the right lower canine tooth broke and it became painful this morning.    REVIEW OF SYSTEMS no fever or chills no nausea vomiting.    PAST MEDICAL HISTORY  Past Medical History   Diagnosis Date   • Anemia      as a teenager   • Asthma      as a child   • Arrhythmia      as a child   • Heart murmur      at age 5or 6    • Depression    • Bipolar 1 disorder (CMS-HCC)    • Borderline personality disorder 2010   • Seizure (CMS-HCC)      off and on. last episode 2 yrs ago.   • Substance abuse      \"I have done pretty much everything\" was in rehab in 06/19/2015       FAMILY HISTORY  No family history on file.    SOCIAL HISTORY  Social History     Social History   • Marital Status: Single     Spouse Name: N/A   • Number of Children: N/A   • Years of Education: N/A     Social History Main Topics   • Smoking status: Current Every Day Smoker -- 0.25 packs/day for 18 years     Types: Cigarettes   • Smokeless tobacco: Not on file      Comment: trying to quit    • Alcohol Use: No   • Drug Use: Yes     Special: Marijuana, Methamphetamines, Cocaine, IV      Comment: Pt states she has been " "cleaned since 06/2015   • Sexual Activity:     Partners: Male      Comment: Unplanned pregnancy     Other Topics Concern   • Not on file     Social History Narrative       SURGICAL HISTORY  Past Surgical History   Procedure Laterality Date   • Cholangiograms N/A 4/5/2016     Procedure: CHOLANGIOGRAMS;  Surgeon: Doug Grewal M.D.;  Location: SURGERY [facility];  Service:    • Alexei by laparoscopy N/A 4/5/2016     Procedure: ALEXEI BY LAPAROSCOPY;  Surgeon: Doug Grewal M.D.;  Location: SURGERY [facility];  Service:        CURRENT MEDICATIONS  Home Medications     **Home medications have not yet been reviewed for this encounter**          ALLERGIES  Allergies   Allergen Reactions   • Aspirin Hives     Reaction is to high dose aspiring,  Tolerated Toradol 04/04/16, patient states can tolerate Ibuprofen       PHYSICAL EXAM  VITAL SIGNS: /92 mmHg  Pulse 68  Temp(Src) 36.9 °C (98.4 °F)  Resp 16  Ht 1.702 m (5' 7\")  Wt 68.8 kg (151 lb 10.8 oz)  BMI 23.75 kg/m2  SpO2 96%  LMP 03/28/2016   Oxygen saturation is interpreted as adequate  Constitutional: Awake and well-appearing individual in no distress  HENT: There are multiple dental caries in the right lower dentition but no evidence of abscess I can drain in the emergency department  Eyes: No erythema or discharge  Neck: Trachea midline no JVD  Cardiovascular: Normal heart rate noted above  Lungs: No respiratory distress  Skin: No visible diaphoresis  Musculoskeletal: No acute bony deformity  Neurologic: Awake and verbal and ambulatory do not see any tremor    CHART REVIEW  [facility] narcotic review shows that the patient filled a prescription for Norco 30 tablets yesterday. The patient did not share this information with me it was obtained by review of the state website..    MEDICAL DECISION MAKING and DISPOSITION  The patient generally looks well at this point in time I do not see any physical signs of withdrawal at all. The " patient has a prescription for Norco which was filled yesterday. I have written her a prescription for amoxicillin and recommended that she see a dentist as soon as possible for further dental evaluation and treatment and I have advised the patient that the emergency department does not dispense methadone and we will be unable to do so today or in the future.    IMPRESSION  1. Dental caries  2. Narcotic dependence  3. Tobacco abuse      Electronically signed by: Neptali Campos, 6/17/2017 12:00 PM

## 2017-06-17 NOTE — ED AVS SNAPSHOT
Home Care Instructions                                                                                                                Minnie Sherman   MRN: [id number]    Department:  [facility], Emergency Dept   Date of Visit:  6/17/2017            [facility], Emergency Dept    [address]    [place] [address]    Phone:  [phone]      You were seen by     Neptali Campos M.D.      Your Diagnosis Was     Dental caries     K02.9       Medication Information     Review all of your home medications and newly ordered medications with your primary doctor and/or pharmacist as soon as possible. Follow medication instructions as directed by your doctor and/or pharmacist.     Please keep your complete medication list with you and share with your physician. Update the information when medications are discontinued, doses are changed, or new medications (including over-the-counter products) are added; and carry medication information at all times in the event of emergency situations.               Medication List      START taking these medications        Instructions    Morning Afternoon Evening Bedtime    amoxicillin 500 MG Caps   Commonly known as:  AMOXIL        Take 1 Cap by mouth 3 times a day.   Dose:  500 mg                          ASK your doctor about these medications        Instructions    Morning Afternoon Evening Bedtime    COMPLETE PRENATAL/DHA 30-0.975 & 300 MG Misc        Take 30 mg by mouth every day at 6 PM.   Dose:  30 mg                        hydrocodone-acetaminophen 5-325 MG Tabs per tablet   Commonly known as:  NORCO        Take 1-2 Tabs by mouth every four hours as needed (for Moderate Pain (Pain Scale 4-6) after delivery).   Dose:  1-2 Tab                        ibuprofen 800 MG Tabs   Commonly known as:  MOTRIN        Take 1 Tab by mouth every 8 hours as needed.   Dose:  800 mg                        PRENATAL 1 PO        Take  by mouth.                       tramadol 50 MG Tabs   Commonly known as:  ULTRAM        Take 1-2 Tabs by mouth every four hours as needed for Moderate Pain or Severe Pain.   Dose:   mg                             Where to Get Your Medications      You can get these medications from any pharmacy     Bring a paper prescription for each of these medications    - amoxicillin 500 MG Caps              Discharge Instructions       See a dentist as soon as possible for further dental evaluation and treatment. The emergency department cannot refill your methadone prescriptions.          Patient Information     Patient Information    Following emergency treatment: all patient requiring follow-up care must return either to a private physician or a clinic if your condition worsens before you are able to obtain further medical attention, please return to the emergency room.     Billing Information    At [facility], we work to make the billing process streamlined for our patients.  Our Representatives are here to answer any questions you may have regarding your hospital bill.  If you have insurance coverage and have supplied your insurance information to us, we will submit a claim to your insurer on your behalf.  Should you have any questions regarding your bill, we can be reached online or by phone as follows:  Online: You are able pay your bills online or live chat with our representatives about any billing questions you may have. We are here to help Monday - Friday from 8:00am to 7:30pm and 9:00am - 12:00pm on Saturdays.  Please visit [email or web address]  for more information.   Phone:  [phone] or [phone]    Please note that your emergency physician, surgeon, pathologist, radiologist, anesthesiologist, and other specialists are not employed by [facility] and will therefore bill separately for their services.  Please contact them directly for any questions concerning their bills at the numbers  below:     Emergency Physician Services:  [phone]  [facility]:  [phone]  [facility]:  [phone]  [facility]:  [phone]    1. Your final bill may vary from the amount quoted upon discharge if all procedures are not complete at that time, or if your doctor has additional procedures of which we are not aware. You will receive an additional bill if you return to the Emergency Department at [facility] for suture removal regardless of the facility of which the sutures were placed.     2. Please arrange for settlement of this account at the emergency registration.    3. All self-pay accounts are due in full at the time of treatment.  If you are unable to meet this obligation then payment is expected within 4-5 days.     4. If you have had radiology studies (CT, X-ray, Ultrasound, MRI), you have received a preliminary result during your emergency department visit. Please contact the radiology department [phone] to receive a copy of your final result. Please discuss the Final result with your primary physician or with the follow up physician provided.     Crisis Hotline:  [place] Crisis Hotline:  9-410-SDPNHJQ or 1-849.653.8111  [place] Crisis Hotline:    1-591.681.3922 or 071-365-8850         ED Discharge Follow Up Questions    1. In order to provide you with very good care, we would like to follow up with a phone call in the next few days.  May we have your permission to contact you?     YES /  NO    2. What is the best phone number to call you? (       )_____-__________    3. What is the best time to call you?      Morning  /  Afternoon  /  Evening                   Patient Signature:  ____________________________________________________________    Date:  ____________________________________________________________

## 2017-08-02 NOTE — DISCHARGE SUMMARY
DATE OF ADMISSION:  2017.    DATE OF DISCHARGE:  2017.    DISCHARGE DIAGNOSES:  1.  Status post a spontaneous vaginal delivery.  2.  Uncomplicated postpartum course.    HISTORY OF PRESENT ILLNESS:  A 31-year-old  2, para 1, at 38 and 3/7   weeks gestation with poor compliance with regarding to prenatal care, who   presented in active labor with a bulging bag of water, 7-8 cm dilated,   completely effaced, 0 station, was subsequently admitted.    PAST MEDICAL HISTORY AND PHYSICAL EXAMINATION:  Can be found in dictated   history and physical.    ASSESSMENT AND PLAN:  A 31-year-old  2, para 1, at term with overall   reassuring fetal status, now elects to proceed forward with admission in   active labor.    HOSPITAL COURSE:  As stated above, the patient was admitted.  She was granted   continuous lumbar epidural to optimize pain management, did undergo   spontaneous vaginal delivery.  Apgars were 8 and 9 at 1 and 5 minutes   respectively.  Her postpartum course was unremarkable and on postpartum day   #2, she had met all discharge criteria and was ambulating and voiding well,   tolerating a regular diet.  Her pain was well controlled.  She was felt to be   appropriate for discharge.    DISCHARGE PLAN:  To follow up in 6 weeks.    DISCHARGE INSTRUCTIONS:  She was to call with any increased temperature   greater than 100.4, increasing vaginal bleeding, abdominal pain unrelieved   with any p.o. pain medication or call with any other questions or concerns.       ____________________________________     MD ELENITA HOFFMAN / JEREMY    DD:  2017 15:16:00  DT:  2017 18:22:44    D#:  [id number]  Job#:  [id number]

## 2019-12-09 ENCOUNTER — TELEPHONE (OUTPATIENT)
Dept: SCHEDULING | Facility: IMAGING CENTER | Age: 34
End: 2019-12-09

## 2021-06-24 ENCOUNTER — APPOINTMENT (OUTPATIENT)
Dept: RADIOLOGY | Facility: MEDICAL CENTER | Age: 36
End: 2021-06-24
Attending: EMERGENCY MEDICINE
Payer: MEDICAID

## 2021-06-24 ENCOUNTER — HOSPITAL ENCOUNTER (EMERGENCY)
Facility: MEDICAL CENTER | Age: 36
End: 2021-06-24
Attending: EMERGENCY MEDICINE
Payer: MEDICAID

## 2021-06-24 VITALS
HEART RATE: 77 BPM | BODY MASS INDEX: 24.53 KG/M2 | HEIGHT: 67 IN | DIASTOLIC BLOOD PRESSURE: 69 MMHG | TEMPERATURE: 97.2 F | WEIGHT: 156.31 LBS | OXYGEN SATURATION: 96 % | SYSTOLIC BLOOD PRESSURE: 135 MMHG | RESPIRATION RATE: 15 BRPM

## 2021-06-24 DIAGNOSIS — O20.0 THREATENED MISCARRIAGE IN EARLY PREGNANCY: ICD-10-CM

## 2021-06-24 DIAGNOSIS — Z20.2 STD EXPOSURE: ICD-10-CM

## 2021-06-24 DIAGNOSIS — B96.89 BACTERIAL VAGINITIS: ICD-10-CM

## 2021-06-24 DIAGNOSIS — N76.0 BACTERIAL VAGINITIS: ICD-10-CM

## 2021-06-24 LAB
ACTION RH IMMUNE GLOB 8505RHG: NORMAL
ALBUMIN SERPL BCP-MCNC: 4 G/DL (ref 3.2–4.9)
ALBUMIN/GLOB SERPL: 1.5 G/DL
ALP SERPL-CCNC: 59 U/L (ref 30–99)
ALT SERPL-CCNC: 20 U/L (ref 2–50)
ANION GAP SERPL CALC-SCNC: 10 MMOL/L (ref 7–16)
APPEARANCE UR: ABNORMAL
AST SERPL-CCNC: 18 U/L (ref 12–45)
B-HCG SERPL-ACNC: ABNORMAL MIU/ML (ref 0–5)
BACTERIA #/AREA URNS HPF: NEGATIVE /HPF
BASOPHILS # BLD AUTO: 0.6 % (ref 0–1.8)
BASOPHILS # BLD: 0.04 K/UL (ref 0–0.12)
BILIRUB SERPL-MCNC: <0.2 MG/DL (ref 0.1–1.5)
BILIRUB UR QL STRIP.AUTO: NEGATIVE
BUN SERPL-MCNC: 11 MG/DL (ref 8–22)
C TRACH DNA SPEC QL NAA+PROBE: NEGATIVE
CALCIUM SERPL-MCNC: 9 MG/DL (ref 8.5–10.5)
CANDIDA DNA VAG QL PROBE+SIG AMP: NEGATIVE
CHLORIDE SERPL-SCNC: 104 MMOL/L (ref 96–112)
CO2 SERPL-SCNC: 23 MMOL/L (ref 20–33)
COLOR UR: YELLOW
CREAT SERPL-MCNC: 0.53 MG/DL (ref 0.5–1.4)
EOSINOPHIL # BLD AUTO: 0.08 K/UL (ref 0–0.51)
EOSINOPHIL NFR BLD: 1.1 % (ref 0–6.9)
EPI CELLS #/AREA URNS HPF: NEGATIVE /HPF
ERYTHROCYTE [DISTWIDTH] IN BLOOD BY AUTOMATED COUNT: 47.1 FL (ref 35.9–50)
G VAGINALIS DNA VAG QL PROBE+SIG AMP: POSITIVE
GLOBULIN SER CALC-MCNC: 2.7 G/DL (ref 1.9–3.5)
GLUCOSE SERPL-MCNC: 89 MG/DL (ref 65–99)
GLUCOSE UR STRIP.AUTO-MCNC: NEGATIVE MG/DL
HCT VFR BLD AUTO: 37.2 % (ref 37–47)
HGB BLD-MCNC: 12.3 G/DL (ref 12–16)
HYALINE CASTS #/AREA URNS LPF: NORMAL /LPF
IMM GRANULOCYTES # BLD AUTO: 0.02 K/UL (ref 0–0.11)
IMM GRANULOCYTES NFR BLD AUTO: 0.3 % (ref 0–0.9)
KETONES UR STRIP.AUTO-MCNC: NEGATIVE MG/DL
LEUKOCYTE ESTERASE UR QL STRIP.AUTO: NEGATIVE
LYMPHOCYTES # BLD AUTO: 1.33 K/UL (ref 1–4.8)
LYMPHOCYTES NFR BLD: 18.6 % (ref 22–41)
MCH RBC QN AUTO: 30.7 PG (ref 27–33)
MCHC RBC AUTO-ENTMCNC: 33.1 G/DL (ref 33.6–35)
MCV RBC AUTO: 92.8 FL (ref 81.4–97.8)
MICRO URNS: ABNORMAL
MONOCYTES # BLD AUTO: 0.47 K/UL (ref 0–0.85)
MONOCYTES NFR BLD AUTO: 6.6 % (ref 0–13.4)
N GONORRHOEA DNA SPEC QL NAA+PROBE: NEGATIVE
NEUTROPHILS # BLD AUTO: 5.22 K/UL (ref 2–7.15)
NEUTROPHILS NFR BLD: 72.8 % (ref 44–72)
NITRITE UR QL STRIP.AUTO: NEGATIVE
NRBC # BLD AUTO: 0 K/UL
NRBC BLD-RTO: 0 /100 WBC
NUMBER OF RH DOSES IND 8505RD: 1
PH UR STRIP.AUTO: 7.5 [PH] (ref 5–8)
PLATELET # BLD AUTO: 176 K/UL (ref 164–446)
PMV BLD AUTO: 11 FL (ref 9–12.9)
POTASSIUM SERPL-SCNC: 4.2 MMOL/L (ref 3.6–5.5)
PROT SERPL-MCNC: 6.7 G/DL (ref 6–8.2)
PROT UR QL STRIP: NEGATIVE MG/DL
RBC # BLD AUTO: 4.01 M/UL (ref 4.2–5.4)
RBC # URNS HPF: NORMAL /HPF
RBC UR QL AUTO: ABNORMAL
RH BLD: NORMAL
SODIUM SERPL-SCNC: 137 MMOL/L (ref 135–145)
SP GR UR STRIP.AUTO: 1.02
SPECIMEN SOURCE: NORMAL
T VAGINALIS DNA VAG QL PROBE+SIG AMP: NEGATIVE
TREPONEMA PALLIDUM IGG+IGM AB [PRESENCE] IN SERUM OR PLASMA BY IMMUNOASSAY: NORMAL
UROBILINOGEN UR STRIP.AUTO-MCNC: 0.2 MG/DL
WBC # BLD AUTO: 7.2 K/UL (ref 4.8–10.8)
WBC #/AREA URNS HPF: NORMAL /HPF

## 2021-06-24 PROCEDURE — 700101 HCHG RX REV CODE 250: Performed by: EMERGENCY MEDICINE

## 2021-06-24 PROCEDURE — 87591 N.GONORRHOEAE DNA AMP PROB: CPT

## 2021-06-24 PROCEDURE — 700111 HCHG RX REV CODE 636 W/ 250 OVERRIDE (IP): Performed by: EMERGENCY MEDICINE

## 2021-06-24 PROCEDURE — 86780 TREPONEMA PALLIDUM: CPT

## 2021-06-24 PROCEDURE — 96374 THER/PROPH/DIAG INJ IV PUSH: CPT

## 2021-06-24 PROCEDURE — 87660 TRICHOMONAS VAGIN DIR PROBE: CPT

## 2021-06-24 PROCEDURE — 80053 COMPREHEN METABOLIC PANEL: CPT

## 2021-06-24 PROCEDURE — 84702 CHORIONIC GONADOTROPIN TEST: CPT

## 2021-06-24 PROCEDURE — 85025 COMPLETE CBC W/AUTO DIFF WBC: CPT

## 2021-06-24 PROCEDURE — 87491 CHLMYD TRACH DNA AMP PROBE: CPT

## 2021-06-24 PROCEDURE — 700102 HCHG RX REV CODE 250 W/ 637 OVERRIDE(OP): Performed by: EMERGENCY MEDICINE

## 2021-06-24 PROCEDURE — 96372 THER/PROPH/DIAG INJ SC/IM: CPT

## 2021-06-24 PROCEDURE — 87510 GARDNER VAG DNA DIR PROBE: CPT

## 2021-06-24 PROCEDURE — 87480 CANDIDA DNA DIR PROBE: CPT

## 2021-06-24 PROCEDURE — 86901 BLOOD TYPING SEROLOGIC RH(D): CPT

## 2021-06-24 PROCEDURE — 81001 URINALYSIS AUTO W/SCOPE: CPT

## 2021-06-24 PROCEDURE — 99285 EMERGENCY DEPT VISIT HI MDM: CPT

## 2021-06-24 PROCEDURE — A9270 NON-COVERED ITEM OR SERVICE: HCPCS | Performed by: EMERGENCY MEDICINE

## 2021-06-24 PROCEDURE — 76816 OB US FOLLOW-UP PER FETUS: CPT

## 2021-06-24 RX ORDER — CEFTRIAXONE 500 MG/1
500 INJECTION, POWDER, FOR SOLUTION INTRAMUSCULAR; INTRAVENOUS ONCE
Status: COMPLETED | OUTPATIENT
Start: 2021-06-24 | End: 2021-06-24

## 2021-06-24 RX ORDER — METHADONE HYDROCHLORIDE 5 MG/1
145 TABLET ORAL
COMMUNITY

## 2021-06-24 RX ORDER — METRONIDAZOLE 500 MG/1
500 TABLET ORAL 2 TIMES DAILY
Qty: 14 TABLET | Refills: 0 | Status: SHIPPED | OUTPATIENT
Start: 2021-06-24 | End: 2021-07-01

## 2021-06-24 RX ORDER — AZITHROMYCIN 250 MG/1
1000 TABLET, FILM COATED ORAL ONCE
Status: COMPLETED | OUTPATIENT
Start: 2021-06-24 | End: 2021-06-24

## 2021-06-24 RX ADMIN — LIDOCAINE HYDROCHLORIDE 2.1 ML: 10 INJECTION, SOLUTION INFILTRATION; PERINEURAL at 10:13

## 2021-06-24 RX ADMIN — CEFTRIAXONE SODIUM 500 MG: 500 INJECTION, POWDER, FOR SOLUTION INTRAMUSCULAR; INTRAVENOUS at 10:11

## 2021-06-24 RX ADMIN — AZITHROMYCIN MONOHYDRATE 1000 MG: 250 TABLET ORAL at 10:13

## 2021-06-24 NOTE — ED PROVIDER NOTES
"ED Provider Note    CHIEF COMPLAINT  Chief Complaint   Patient presents with   • Vaginal Bleeding     Pt reports an episode of BRB bleeding that was small in amount.  SHe reports pink urine last night.     • Pregnancy     Pt believes she is 14 weeks pregnant.  Pt reports being Rh-   • Exposure to STD     Pt reports recent exposure to STD       HPI  Minnie Sherman is a 35 y.o. female who presents to the emergency department with complaint of vaginal bleeding.  She states that she is approximately 12 to 14 weeks pregnant as her last menstrual cycle was 3-1/2 months prior.  She started having slight vaginal bleeding and spotting with wiping earlier today.  Denies pain, fever, shakes, chills, sweats.  In addition, she endorses that her ex partner has had sex relations with multiple sex workers that she believes she has an STD.  She does not endorse the fact she is had slight white discharge that smells differently than before.  She is G4, P2 female.  Had a confirmed pregnancy with urine pregnancy test x3 in the past.    REVIEW OF SYSTEMS  Positives as above. Pertinent negatives include fever, nausea, vomiting  All other 10 review of systems are negative    PAST MEDICAL HISTORY  Past Medical History:   Diagnosis Date   • Anemia     as a teenager   • Arrhythmia     as a child   • Asthma     as a child   • Bipolar 1 disorder (HCC)    • Borderline personality disorder (HCC) 2010   • Depression    • Heart murmur     at age 5or 6    • Seizure (HCC)     off and on. last episode 2 yrs ago.   • Substance abuse (HCC)     \"I have done pretty much everything\" was in rehab in 06/19/2015       FAMILY HISTORY  Noncontributory    SOCIAL HISTORY  Social History     Socioeconomic History   • Marital status: Single     Spouse name: Not on file   • Number of children: Not on file   • Years of education: Not on file   • Highest education level: Not on file   Occupational History   • Not on file   Tobacco Use   • Smoking status: " Current Every Day Smoker     Packs/day: 0.25     Years: 18.00     Pack years: 4.50     Types: Cigarettes   • Tobacco comment: trying to quit    Substance and Sexual Activity   • Alcohol use: No   • Drug use: Yes     Types: Marijuana, Methamphetamines, Cocaine, IV     Comment: Pt states she has been cleaned since 06/2015   • Sexual activity: Yes     Partners: Male     Comment: Unplanned pregnancy   Other Topics Concern   • Not on file   Social History Narrative   • Not on file     Social Determinants of Health     Financial Resource Strain:    • Difficulty of Paying Living Expenses:    Food Insecurity:    • Worried About Running Out of Food in the Last Year:    • Ran Out of Food in the Last Year:    Transportation Needs:    • Lack of Transportation (Medical):    • Lack of Transportation (Non-Medical):    Physical Activity:    • Days of Exercise per Week:    • Minutes of Exercise per Session:    Stress:    • Feeling of Stress :    Social Connections:    • Frequency of Communication with Friends and Family:    • Frequency of Social Gatherings with Friends and Family:    • Attends [employer] Services:    • Active Member of Clubs or Organizations:    • Attends Club or Organization Meetings:    • Marital Status:    Intimate Partner Violence:    • Fear of Current or Ex-Partner:    • Emotionally Abused:    • Physically Abused:    • Sexually Abused:        SURGICAL HISTORY  Past Surgical History:   Procedure Laterality Date   • CHOLANGIOGRAMS N/A 4/5/2016    Procedure: CHOLANGIOGRAMS;  Surgeon: Doug Grewal M.D.;  Location: SURGERY [facility];  Service:    • ALEXEI BY LAPAROSCOPY N/A 4/5/2016    Procedure: ALEXEI BY LAPAROSCOPY;  Surgeon: Doug Grewal M.D.;  Location: SURGERY [facility];  Service:        CURRENT MEDICATIONS  Home Medications     Reviewed by Gabriela Etienne R.N. (Registered Nurse) on 06/24/21 at 0819  Med List Status: Complete   Medication Last Dose Status   amoxicillin (AMOXIL)  "500 MG Cap not taking Active   hydrocodone-acetaminophen (NORCO) 5-325 MG Tab per tablet not taking Active   ibuprofen (MOTRIN) 800 MG Tab not taking Active   methadone (DOLOPHINE) 5 MG Tab 6/24/2021 Active   Prenatal MV-Min-Fe Fum-FA-DHA (PRENATAL 1 PO) 6/24/2021 Active   Prenatal-FE Bis-FA-DHA w/o A (COMPLETE PRENATAL/DHA) 30-0.975 & 300 MG Misc not taking Active   tramadol (ULTRAM) 50 MG Tab not taking Active                ALLERGIES  Allergies   Allergen Reactions   • Aspirin Hives     Reaction is to high dose aspiring,  Tolerated Toradol 04/04/16, patient states can tolerate Ibuprofen       PHYSICAL EXAM  VITAL SIGNS: /69   Pulse 77   Temp 36.2 °C (97.2 °F) (Temporal)   Resp 15   Ht 1.702 m (5' 7\")   Wt 70.9 kg (156 lb 4.9 oz)   LMP  (LMP Unknown)   SpO2 96%   BMI 24.48 kg/m²      Constitutional: Well developed, Well nourished, No acute distress, Non-toxic appearance.   Eyes: PERRLA, EOMI, Conjunctiva normal, No discharge.   Cardiovascular: Normal heart rate, Normal rhythm, No murmurs, No rubs, No gallops, and intact distal pulses.   Thorax & Lungs:  No respiratory distress, no rales, no rhonchi, No wheezing, No chest wall tenderness.   Abdomen: Bowel sounds normal, Soft, No tenderness, No guarding, No rebound, No pulsatile masses.   Skin: Warm, Dry, No erythema, No rash.   Pelvic: Presence of a female technician, Nathalie, external examination reveals no lesion, no active bleeding, speculum exam reveals slight dark red blood, she has a close cervical os, bimanual examination closed over the loss, no uterine tenderness, no axial tenderness bilaterally  Extremities: Full range of motion, no deformity, no edema.  Neurologic: Alert & oriented x 3, No focal deficits noted, acting appropriately on exam.  Psychiatric: Affect normal for clinical presentation.      RADIOLOGY/PROCEDURES  US-OB GROWTH WITH TRANSVAGINAL (COMBO)   Final Result      1.  Single intrauterine pregnancy of an estimated gestational " age of 16 weeks and 0 days with an estimated date of delivery of 12/9/2021.      2.  Marginal placenta           Results for orders placed or performed during the hospital encounter of 06/24/21   CBC WITH DIFFERENTIAL   Result Value Ref Range    WBC 7.2 4.8 - 10.8 K/uL    RBC 4.01 (L) 4.20 - 5.40 M/uL    Hemoglobin 12.3 12.0 - 16.0 g/dL    Hematocrit 37.2 37.0 - 47.0 %    MCV 92.8 81.4 - 97.8 fL    MCH 30.7 27.0 - 33.0 pg    MCHC 33.1 (L) 33.6 - 35.0 g/dL    RDW 47.1 35.9 - 50.0 fL    Platelet Count 176 164 - 446 K/uL    MPV 11.0 9.0 - 12.9 fL    Neutrophils-Polys 72.80 (H) 44.00 - 72.00 %    Lymphocytes 18.60 (L) 22.00 - 41.00 %    Monocytes 6.60 0.00 - 13.40 %    Eosinophils 1.10 0.00 - 6.90 %    Basophils 0.60 0.00 - 1.80 %    Immature Granulocytes 0.30 0.00 - 0.90 %    Nucleated RBC 0.00 /100 WBC    Neutrophils (Absolute) 5.22 2.00 - 7.15 K/uL    Lymphs (Absolute) 1.33 1.00 - 4.80 K/uL    Monos (Absolute) 0.47 0.00 - 0.85 K/uL    Eos (Absolute) 0.08 0.00 - 0.51 K/uL    Baso (Absolute) 0.04 0.00 - 0.12 K/uL    Immature Granulocytes (abs) 0.02 0.00 - 0.11 K/uL    NRBC (Absolute) 0.00 K/uL   COMP METABOLIC PANEL   Result Value Ref Range    Sodium 137 135 - 145 mmol/L    Potassium 4.2 3.6 - 5.5 mmol/L    Chloride 104 96 - 112 mmol/L    Co2 23 20 - 33 mmol/L    Anion Gap 10.0 7.0 - 16.0    Glucose 89 65 - 99 mg/dL    Bun 11 8 - 22 mg/dL    Creatinine 0.53 0.50 - 1.40 mg/dL    Calcium 9.0 8.5 - 10.5 mg/dL    AST(SGOT) 18 12 - 45 U/L    ALT(SGPT) 20 2 - 50 U/L    Alkaline Phosphatase 59 30 - 99 U/L    Total Bilirubin <0.2 0.1 - 1.5 mg/dL    Albumin 4.0 3.2 - 4.9 g/dL    Total Protein 6.7 6.0 - 8.2 g/dL    Globulin 2.7 1.9 - 3.5 g/dL    A-G Ratio 1.5 g/dL   RH TYPE FOR RHOGAM FROM E.D.   Result Value Ref Range    Emergency Department Rh Typing NEG     Number Of Rh Doses Indicated 1    HCG QUANTITATIVE   Result Value Ref Range    [facility] [address] (H) 0.0 - 5.0 mIU/mL   URINALYSIS,CULTURE IF INDICATED    Specimen: Urine,  Clean Catch; Blood   Result Value Ref Range    Color Yellow     Character Turbid (A)     Specific Gravity 1.016 <1.035    Ph 7.5 5.0 - 8.0    Glucose Negative Negative mg/dL    Ketones Negative Negative mg/dL    Protein Negative Negative mg/dL    Bilirubin Negative Negative    Urobilinogen, Urine 0.2 Negative    Nitrite Negative Negative    Leukocyte Esterase Negative Negative    Occult Blood Trace (A) Negative    Micro Urine Req Microscopic    T.PALLIDUM AB EIA (Syphilis)   Result Value Ref Range    Syphilis, Treponemal Qual Non-Reactive Non-Reactive   CHLAMYDIA & GC BY PCR    Specimen: Genital   Result Value Ref Range    Source Vaginal    URINE MICROSCOPIC (W/UA)   Result Value Ref Range    WBC 0-2 /hpf    RBC 0-2 /hpf    Bacteria Negative None /hpf    Epithelial Cells Negative /hpf    Hyaline Cast 0-2 /lpf   VAGINAL PATHOGENS DNA PANEL   Result Value Ref Range    Candida species DNA Probe Negative Negative    Trichamonas vaginalis DNA Probe Negative Negative    Gardnerella vaginalis DNA Probe POSITIVE (A) Negative   ESTIMATED GFR   Result Value Ref Range    GFR If African American >60 >60 mL/min/1.73 m 2    GFR If Non African American >60 >60 mL/min/1.73 m 2   ACTION: RH IMMUNE GLOBULIN   Result Value Ref Range    Action  Rh Immune Globulin [id number]       issued       1 Syrng          COURSE & MEDICAL DECISION MAKING  Pertinent Labs & Imaging studies reviewed. (See chart for details)  Is a pleasant 35-year-old female presents with intrauterine pregnancy at 16 weeks with slight vaginal spotting.  The patient is Rh- therefore she received RhoGam as well.  The patient does have a an exposure to an STD therefore she is empirically treated with ceftriaxone fibrils IM as well as a azithromycin 1 g secondary to pregnancy.  In addition I encouraged her to follow-up with her gynecologist/OB in 1 month for repeat evaluation to prove the cure and again in 3 months as this is very deleterious to her intrauterine pregnancy.   In addition, the patient has bacterial vaginitis and received Flagyl prescription.  The patient has no evidence of spontaneous  here in the emergency department.  Patient asked me for nicotine lozenge years and I have prescribed this for her as well.  Patient understands the need to respect pelvic rest, she is to return to the emergency room for increasing symptomatology follow-up with her OB gynecologist in the near future.      FINAL IMPRESSION     1. Threatened miscarriage in early pregnancy    2. STD exposure    3. Bacterial vaginitis        DISPOSITION:  Patient will be discharged home in stable condition.    FOLLOW UP:  [facility], Emergency Dept  [address] [place] [id number] [phone]    If symptoms worsen      OUTPATIENT MEDICATIONS:  Discharge Medication List as of 2021 11:05 AM      START taking these medications    Details   metroNIDAZOLE (FLAGYL) 500 MG Tab Take 1 tablet by mouth 2 times a day for 7 days., Disp-14 tablet, R-0, Normal      nicotine polacrilex (HM NICOTINE POLACRILEX) 4 MG lozenge Place 1 Lozenge into mouth between cheek and gum every 2 hours., Disp-168 Lozenge, R-0, Normal               Electronically signed by: Marcin Canela D.O., 2021 8:49 AM

## 2021-06-24 NOTE — ED NOTES
Lab work obtained but unable to advance IV. IV d/c'd @ this time. Pt educated would start IV if needed, but per her request waiting to see if IV is needed. Will cont to monitor pt

## 2021-06-24 NOTE — ED TRIAGE NOTES
Chief Complaint   Patient presents with   • Vaginal Bleeding     Pt reports an episode of BRB bleeding that was small in amount.  SHe reports pink urine last night.     • Pregnancy     Pt believes she is 14 weeks pregnant.  Pt reports being Rh-   • Exposure to STD     Pt reports recent exposure to STD     Vaginal bleeding protocol order implemented.

## 2021-06-24 NOTE — DISCHARGE INSTRUCTIONS
You are 16 weeks pregnant and her due date is on 12/9/2021    You will need to follow-[facility] with [facility].  We have empirically treated you thus far secondary to your pregnant state.  In addition if you are positive, you will need to follow-up with a gynecologist and had repeat testing for a diagnosis of being cured at 1 month and at 3 months from today.  Your positive, please contact your partner as they will need to be checked as well.  Follow-up with a OB gynecologist for further evaluation of your pregnancy.  Please respect pelvic rest by not having sexual intercourse, douching, placing tampons in vagina until you have been seen by OB.

## 2021-06-24 NOTE — ED NOTES
"PT states last night she noted some pink discharge while wiping after using the restroom. Pt states today she noticed bright red blood . Pt is unaware of LMP or how far into her pregnancy she is. Pt states, \" my exboyfriend slept with a lot of hookers.\" I would like to be checked for STDs\"  Per ERP @ bedside we will run tests. UAP sent from triage, blood collected et sent . ERP @ bedside with Nathalie HUBER) for pelvic exam. Swabs sent to lab  "

## 2021-07-07 PROBLEM — O09.529 ADVANCED MATERNAL AGE IN MULTIGRAVIDA: Status: ACTIVE | Noted: 2021-07-07

## 2021-07-09 ENCOUNTER — APPOINTMENT (OUTPATIENT)
Dept: OBGYN | Facility: CLINIC | Age: 36
End: 2021-07-09
Payer: MEDICAID

## 2021-07-09 ENCOUNTER — INITIAL PRENATAL (OUTPATIENT)
Dept: OBGYN | Facility: CLINIC | Age: 36
End: 2021-07-09
Payer: MEDICAID

## 2021-07-09 ENCOUNTER — HOSPITAL ENCOUNTER (OUTPATIENT)
Facility: MEDICAL CENTER | Age: 36
End: 2021-07-09
Attending: NURSE PRACTITIONER
Payer: MEDICAID

## 2021-07-09 VITALS — WEIGHT: 162 LBS | BODY MASS INDEX: 25.37 KG/M2 | SYSTOLIC BLOOD PRESSURE: 124 MMHG | DIASTOLIC BLOOD PRESSURE: 64 MMHG

## 2021-07-09 DIAGNOSIS — Z87.898 HISTORY OF SEIZURES: ICD-10-CM

## 2021-07-09 DIAGNOSIS — O09.529 ANTEPARTUM MULTIGRAVIDA OF ADVANCED MATERNAL AGE: ICD-10-CM

## 2021-07-09 DIAGNOSIS — A60.00 GENITAL HERPES SIMPLEX, UNSPECIFIED SITE: ICD-10-CM

## 2021-07-09 DIAGNOSIS — Z34.90 PRENATAL CARE, ANTEPARTUM: ICD-10-CM

## 2021-07-09 DIAGNOSIS — F19.91 HISTORY OF DRUG USE: ICD-10-CM

## 2021-07-09 PROBLEM — Z67.91 RH NEGATIVE STATUS DURING PREGNANCY: Status: ACTIVE | Noted: 2021-07-09

## 2021-07-09 PROBLEM — O44.20 MARGINAL PLACENTA: Status: ACTIVE | Noted: 2021-07-09

## 2021-07-09 PROBLEM — O26.899 RH NEGATIVE STATUS DURING PREGNANCY: Status: ACTIVE | Noted: 2021-07-09

## 2021-07-09 LAB
APPEARANCE UR: CLEAR
BILIRUB UR STRIP-MCNC: NORMAL MG/DL
COLOR UR AUTO: YELLOW
GLUCOSE UR STRIP.AUTO-MCNC: NEGATIVE MG/DL
KETONES UR STRIP.AUTO-MCNC: NEGATIVE MG/DL
LEUKOCYTE ESTERASE UR QL STRIP.AUTO: NEGATIVE
NITRITE UR QL STRIP.AUTO: NEGATIVE
PH UR STRIP.AUTO: 7 [PH] (ref 5–8)
PROT UR QL STRIP: NEGATIVE MG/DL
RBC UR QL AUTO: NEGATIVE
SP GR UR STRIP.AUTO: 1.02
UROBILINOGEN UR STRIP-MCNC: NORMAL MG/DL

## 2021-07-09 PROCEDURE — 87624 HPV HI-RISK TYP POOLED RSLT: CPT

## 2021-07-09 PROCEDURE — 0500F INITIAL PRENATAL CARE VISIT: CPT | Performed by: NURSE PRACTITIONER

## 2021-07-09 PROCEDURE — 87591 N.GONORRHOEAE DNA AMP PROB: CPT

## 2021-07-09 PROCEDURE — 81002 URINALYSIS NONAUTO W/O SCOPE: CPT | Performed by: NURSE PRACTITIONER

## 2021-07-09 PROCEDURE — 87491 CHLMYD TRACH DNA AMP PROBE: CPT

## 2021-07-09 PROCEDURE — 88175 CYTOPATH C/V AUTO FLUID REDO: CPT

## 2021-07-09 RX ORDER — CHOLECALCIFEROL (VITAMIN D3) 25 MCG
1 TABLET,CHEWABLE ORAL DAILY
Qty: 30 CAPSULE | Refills: 10 | Status: SHIPPED | OUTPATIENT
Start: 2021-07-09

## 2021-07-09 RX ORDER — VALACYCLOVIR HYDROCHLORIDE 500 MG/1
500 TABLET, FILM COATED ORAL DAILY
Qty: 60 TABLET | Refills: 4 | Status: ON HOLD | OUTPATIENT
Start: 2021-07-09 | End: 2021-11-16

## 2021-07-09 ASSESSMENT — EDINBURGH POSTNATAL DEPRESSION SCALE (EPDS)
I HAVE BEEN SO UNHAPPY THAT I HAVE BEEN CRYING: ONLY OCCASIONALLY
TOTAL SCORE: 9
I HAVE BEEN SO UNHAPPY THAT I HAVE HAD DIFFICULTY SLEEPING: NOT AT ALL
I HAVE BEEN ABLE TO LAUGH AND SEE THE FUNNY SIDE OF THINGS: AS MUCH AS I ALWAYS COULD
I HAVE BEEN ANXIOUS OR WORRIED FOR NO GOOD REASON: YES, VERY OFTEN
I HAVE FELT SAD OR MISERABLE: NOT VERY OFTEN
I HAVE BLAMED MYSELF UNNECESSARILY WHEN THINGS WENT WRONG: YES, SOME OF THE TIME
I HAVE FELT SCARED OR PANICKY FOR NO GOOD REASON: NO, NOT MUCH
THE THOUGHT OF HARMING MYSELF HAS OCCURRED TO ME: NEVER
I HAVE LOOKED FORWARD WITH ENJOYMENT TO THINGS: AS MUCH AS I EVER DID
THINGS HAVE BEEN GETTING ON TOP OF ME: NO, MOST OF THE TIME I HAVE COPED QUITE WELL

## 2021-07-09 ASSESSMENT — FIBROSIS 4 INDEX: FIB4 SCORE: 0.8

## 2021-07-09 NOTE — PROGRESS NOTES
NOB today  LMP: unsure  Last pap: today  [phone] (home)   Pharmacy confirmed  On PNV  Cystic Fibrosis test declined  AFP desired

## 2021-07-09 NOTE — LETTER
July 9, 2021      Minnie Sherman is currently pregnant and being cared for by [facility]. She should be allowed to sleep in the bottom bunk only if this is possible, thank you for understanding.         Thank you,          PATY Kruger.    Electronically Signed
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
## 2021-07-12 LAB
C TRACH DNA GENITAL QL NAA+PROBE: NEGATIVE
CYTOLOGY REG CYTOL: NORMAL
HPV HR 12 DNA CVX QL NAA+PROBE: NEGATIVE
HPV16 DNA SPEC QL NAA+PROBE: NEGATIVE
HPV18 DNA SPEC QL NAA+PROBE: NEGATIVE
N GONORRHOEA DNA GENITAL QL NAA+PROBE: NEGATIVE
SPECIMEN SOURCE: NORMAL
SPECIMEN SOURCE: NORMAL

## 2021-07-15 ENCOUNTER — HOSPITAL ENCOUNTER (OUTPATIENT)
Dept: LAB | Facility: MEDICAL CENTER | Age: 36
End: 2021-07-15
Attending: NURSE PRACTITIONER
Payer: MEDICAID

## 2021-07-15 DIAGNOSIS — O09.529 ANTEPARTUM MULTIGRAVIDA OF ADVANCED MATERNAL AGE: ICD-10-CM

## 2021-07-15 LAB
ABO GROUP BLD: ABNORMAL
APPEARANCE UR: CLEAR
BASOPHILS # BLD AUTO: 0.4 % (ref 0–1.8)
BASOPHILS # BLD: 0.03 K/UL (ref 0–0.12)
BILIRUB UR QL STRIP.AUTO: NEGATIVE
BLD GP AB INVEST PLASRBC-IMP: ABNORMAL
BLD GP AB SCN SERPL QL: ABNORMAL
COLOR UR: YELLOW
EOSINOPHIL # BLD AUTO: 0.08 K/UL (ref 0–0.51)
EOSINOPHIL NFR BLD: 1.1 % (ref 0–6.9)
ERYTHROCYTE [DISTWIDTH] IN BLOOD BY AUTOMATED COUNT: 48.6 FL (ref 35.9–50)
GLUCOSE UR STRIP.AUTO-MCNC: NEGATIVE MG/DL
HBV SURFACE AG SER QL: ABNORMAL
HCT VFR BLD AUTO: 35.1 % (ref 37–47)
HCV AB SER QL: REACTIVE
HGB BLD-MCNC: 11.7 G/DL (ref 12–16)
HIV 1+2 AB+HIV1 P24 AG SERPL QL IA: NORMAL
IMM GRANULOCYTES # BLD AUTO: 0.03 K/UL (ref 0–0.11)
IMM GRANULOCYTES NFR BLD AUTO: 0.4 % (ref 0–0.9)
KETONES UR STRIP.AUTO-MCNC: NEGATIVE MG/DL
LEUKOCYTE ESTERASE UR QL STRIP.AUTO: NEGATIVE
LYMPHOCYTES # BLD AUTO: 1.63 K/UL (ref 1–4.8)
LYMPHOCYTES NFR BLD: 22.1 % (ref 22–41)
MCH RBC QN AUTO: 32 PG (ref 27–33)
MCHC RBC AUTO-ENTMCNC: 33.3 G/DL (ref 33.6–35)
MCV RBC AUTO: 95.9 FL (ref 81.4–97.8)
MICRO URNS: ABNORMAL
MONOCYTES # BLD AUTO: 0.47 K/UL (ref 0–0.85)
MONOCYTES NFR BLD AUTO: 6.4 % (ref 0–13.4)
NEUTROPHILS # BLD AUTO: 5.12 K/UL (ref 2–7.15)
NEUTROPHILS NFR BLD: 69.6 % (ref 44–72)
NITRITE UR QL STRIP.AUTO: NEGATIVE
NRBC # BLD AUTO: 0 K/UL
NRBC BLD-RTO: 0 /100 WBC
PH UR STRIP.AUTO: 6.5 [PH] (ref 5–8)
PLATELET # BLD AUTO: 154 K/UL (ref 164–446)
PMV BLD AUTO: 11.2 FL (ref 9–12.9)
PROT UR QL STRIP: NEGATIVE MG/DL
RBC # BLD AUTO: 3.66 M/UL (ref 4.2–5.4)
RBC UR QL AUTO: NEGATIVE
RH BLD: ABNORMAL
RUBV AB SER QL: 143 IU/ML
SP GR UR STRIP.AUTO: 1.02
TREPONEMA PALLIDUM IGG+IGM AB [PRESENCE] IN SERUM OR PLASMA BY IMMUNOASSAY: ABNORMAL
UROBILINOGEN UR STRIP.AUTO-MCNC: 0.2 MG/DL
WBC # BLD AUTO: 7.4 K/UL (ref 4.8–10.8)

## 2021-07-15 PROCEDURE — 87389 HIV-1 AG W/HIV-1&-2 AB AG IA: CPT

## 2021-07-15 PROCEDURE — 86780 TREPONEMA PALLIDUM: CPT

## 2021-07-15 PROCEDURE — 87086 URINE CULTURE/COLONY COUNT: CPT

## 2021-07-15 PROCEDURE — 86803 HEPATITIS C AB TEST: CPT

## 2021-07-15 PROCEDURE — G0480 DRUG TEST DEF 1-7 CLASSES: HCPCS

## 2021-07-15 PROCEDURE — 86900 BLOOD TYPING SEROLOGIC ABO: CPT

## 2021-07-15 PROCEDURE — 86762 RUBELLA ANTIBODY: CPT

## 2021-07-15 PROCEDURE — 87522 HEPATITIS C REVRS TRNSCRPJ: CPT

## 2021-07-15 PROCEDURE — 86901 BLOOD TYPING SEROLOGIC RH(D): CPT

## 2021-07-15 PROCEDURE — 86870 RBC ANTIBODY IDENTIFICATION: CPT

## 2021-07-15 PROCEDURE — 36415 COLL VENOUS BLD VENIPUNCTURE: CPT

## 2021-07-15 PROCEDURE — 80307 DRUG TEST PRSMV CHEM ANLYZR: CPT

## 2021-07-15 PROCEDURE — 81511 FTL CGEN ABNOR FOUR ANAL: CPT

## 2021-07-15 PROCEDURE — 81003 URINALYSIS AUTO W/O SCOPE: CPT | Mod: XU

## 2021-07-15 PROCEDURE — 86850 RBC ANTIBODY SCREEN: CPT

## 2021-07-15 PROCEDURE — 85025 COMPLETE CBC W/AUTO DIFF WBC: CPT

## 2021-07-15 PROCEDURE — 87340 HEPATITIS B SURFACE AG IA: CPT

## 2021-07-16 PROBLEM — O36.0190 ANTI-D ANTIBODIES PRESENT DURING PREGNANCY: Status: ACTIVE | Noted: 2021-07-16

## 2021-07-17 LAB
AMPHET CTO UR CFM-MCNC: NEGATIVE NG/ML
BACTERIA UR CULT: NORMAL
BARBITURATES CTO UR CFM-MCNC: NEGATIVE NG/ML
BENZODIAZ CTO UR CFM-MCNC: NEGATIVE NG/ML
CANNABINOIDS CTO UR CFM-MCNC: NEGATIVE NG/ML
COCAINE CTO UR CFM-MCNC: NEGATIVE NG/ML
DRUG COMMENT 753798: NORMAL
METHADONE CTO UR CFM-MCNC: POSITIVE NG/ML
OPIATES CTO UR CFM-MCNC: NEGATIVE NG/ML
PCP CTO UR CFM-MCNC: NEGATIVE NG/ML
PROPOXYPH CTO UR CFM-MCNC: NEGATIVE NG/ML
SIGNIFICANT IND 70042: NORMAL
SITE SITE: NORMAL
SOURCE SOURCE: NORMAL

## 2021-07-18 LAB
# FETUSES US: NORMAL
AFP MOM SERPL: 2.19
AFP SERPL-MCNC: 108 NG/ML
AGE - REPORTED: 36.1 YR
CURRENT SMOKER: NO
FAMILY MEMBER DISEASES HX: NO
GA METHOD: NORMAL
GA: NORMAL WK
HCG MOM SERPL: 0.38
HCG SERPL-ACNC: 7899 IU/L
HCV RNA SERPL NAA+PROBE-ACNC: NOT DETECTED IU/ML
HCV RNA SERPL NAA+PROBE-LOG IU: NOT DETECTED LOG IU/ML
HCV RNA SERPL QL NAA+PROBE: NOT DETECTED
HX OF HEREDITARY DISORDERS: NO
IDDM PATIENT QL: NO
INHIBIN A MOM SERPL: 0.66
INHIBIN A SERPL-MCNC: 98 PG/ML
INTEGRATED SCN PATIENT-IMP: NORMAL
PATHOLOGY STUDY: NORMAL
SPECIMEN DRAWN SERPL: NORMAL
U ESTRIOL MOM SERPL: 0.77
U ESTRIOL SERPL-MCNC: 1.56 NG/ML

## 2021-07-21 LAB
EDDP UR CFM-MCNC: >5000 NG/ML
METHADONE UR CFM-MCNC: >5000 NG/ML

## 2021-07-22 ENCOUNTER — TELEPHONE (OUTPATIENT)
Dept: OBGYN | Facility: CLINIC | Age: 36
End: 2021-07-22

## 2021-07-22 ENCOUNTER — TELEPHONE (OUTPATIENT)
Dept: OBGYN | Facility: CLINIC | Age: 36
End: 2021-07-22
Payer: MEDICAID

## 2021-07-22 NOTE — TELEPHONE ENCOUNTER
Scanned and faxed Documents to patients chart to be signed at her next ob visit 08/06/2021. Faxed to Angelique ADKINS.

## 2021-07-22 NOTE — TELEPHONE ENCOUNTER
Andreia from [facility] requesting information, will fax what she is inquiring about and would like to have it faxed to [phone]

## 2021-07-23 ENCOUNTER — TELEPHONE (OUTPATIENT)
Dept: OBGYN | Facility: CLINIC | Age: 36
End: 2021-07-23

## 2021-07-23 NOTE — TELEPHONE ENCOUNTER
Andreia from [facility] called regarding some paperwork needed to be faxed over; Griselda stated she gave the inquiring papers to Dr.ernst Marcie medical assistant for a more supportive note.

## 2021-07-26 ENCOUNTER — TELEPHONE (OUTPATIENT)
Dept: OBGYN | Facility: CLINIC | Age: 36
End: 2021-07-26

## 2021-07-26 NOTE — TELEPHONE ENCOUNTER
Andreia from [facility] called and needs a statement stating that she needs a form filled out by the provider that she last saw. It has to do with her being on suboxone. Form is in her chart in media

## 2021-07-26 NOTE — TELEPHONE ENCOUNTER
Pt called in regards to her labs being positive for alcohol. States she has not been drinking, her question is, may gestational diabetes cause a false positive?

## 2021-07-28 ENCOUNTER — TELEPHONE (OUTPATIENT)
Dept: OBGYN | Facility: CLINIC | Age: 36
End: 2021-07-28

## 2021-07-28 NOTE — TELEPHONE ENCOUNTER
Andreia from [facility] called today in regards to a form she sent over that needed to be signed and faxed. Andreia states she has called the office multiple times and has yet to receive the paperwork. Let Andreia know that I will get that signed and faxed over to her as soon as possible. Andreia also requested progress notes from patients last visit. Let her know those will be faxed also. Tried faxing forms over and they are not going through. Faxed them 3 times and have not gone through. Andreia called back the CMA line and I was able to speak to her to let her know that the fax was not going through. Andreia provided an email and all information was emailed her.

## 2021-08-02 ENCOUNTER — APPOINTMENT (OUTPATIENT)
Dept: RADIOLOGY | Facility: IMAGING CENTER | Age: 36
End: 2021-08-02
Attending: NURSE PRACTITIONER
Payer: MEDICAID

## 2021-08-02 DIAGNOSIS — O09.529 ANTEPARTUM MULTIGRAVIDA OF ADVANCED MATERNAL AGE: ICD-10-CM

## 2021-08-02 PROCEDURE — 76817 TRANSVAGINAL US OBSTETRIC: CPT | Mod: TC | Performed by: NURSE PRACTITIONER

## 2021-08-02 PROCEDURE — 76805 OB US >/= 14 WKS SNGL FETUS: CPT | Mod: TC | Performed by: NURSE PRACTITIONER

## 2021-08-03 ENCOUNTER — TELEPHONE (OUTPATIENT)
Dept: OBGYN | Facility: CLINIC | Age: 36
End: 2021-08-03

## 2021-08-03 DIAGNOSIS — O44.20 MARGINAL PLACENTA: Primary | ICD-10-CM

## 2021-08-03 NOTE — TELEPHONE ENCOUNTER
----- Message from MONSE Gutiérrez sent at 8/3/2021 10:06 AM PDT -----  Marginal previa - please give pt previa precautions.  Repeat TVUS ordered, please schedule in 6wks.       8/3/2021 1511 Left message for pt to call back regarding US results.   8/5/2021 1106 Pt notified of marginal placenta previa, advised to be on pelvic rest which includes, no sex, no orgasm, nothing in vagina, no lifting anything > 10lb and stay well hydrated, if notice VB to go to L&D ASAP. Pt will have an US to recheck placenta location in 6weeks. Pt verbalized understanding. Pt notified one of [facility] will give her a call back to schedule US appt. Pt agreed.   Message sent to [facility] to schedule pt.

## 2021-08-11 ENCOUNTER — TELEPHONE (OUTPATIENT)
Dept: OBGYN | Facility: CLINIC | Age: 36
End: 2021-08-11

## 2021-08-16 ENCOUNTER — TELEPHONE (OUTPATIENT)
Dept: OBGYN | Facility: CLINIC | Age: 36
End: 2021-08-16

## 2021-08-16 ENCOUNTER — HOSPITAL ENCOUNTER (EMERGENCY)
Facility: MEDICAL CENTER | Age: 36
End: 2021-08-16
Attending: OBSTETRICS & GYNECOLOGY | Admitting: OBSTETRICS & GYNECOLOGY
Payer: MEDICAID

## 2021-08-16 VITALS
DIASTOLIC BLOOD PRESSURE: 54 MMHG | SYSTOLIC BLOOD PRESSURE: 112 MMHG | HEART RATE: 67 BPM | RESPIRATION RATE: 16 BRPM | OXYGEN SATURATION: 98 % | TEMPERATURE: 98.5 F

## 2021-08-16 DIAGNOSIS — R30.0 DYSURIA: ICD-10-CM

## 2021-08-16 LAB
APPEARANCE UR: CLEAR
COLOR UR AUTO: YELLOW
GLUCOSE UR QL STRIP.AUTO: NEGATIVE MG/DL
KETONES UR QL STRIP.AUTO: NEGATIVE MG/DL
LEUKOCYTE ESTERASE UR QL STRIP.AUTO: NEGATIVE
NITRITE UR QL STRIP.AUTO: NEGATIVE
PH UR STRIP.AUTO: 7 [PH] (ref 5–8)
PROT UR QL STRIP: NEGATIVE MG/DL
RBC UR QL AUTO: NEGATIVE
SP GR UR STRIP.AUTO: 1.02 (ref 1–1.03)

## 2021-08-16 PROCEDURE — 81002 URINALYSIS NONAUTO W/O SCOPE: CPT

## 2021-08-16 PROCEDURE — 302449 STATCHG TRIAGE ONLY (STATISTIC)

## 2021-08-16 ASSESSMENT — PAIN SCALES - GENERAL: PAINLEVEL: 3

## 2021-08-17 NOTE — PROGRESS NOTES
EDC  23      1555-pt presents from home with c/o possible UTI, states that she was feeling some cramping yesterday and that today it was very painful to void, no c/o leaking, bleeding or other pain, states baby is moving normally, FHR dopplered at 150, TOCO applied, vs taken and set for q 10 due to elevation, no c/o headaches, visual changes or RUQ pain, reflexes normal, no clonus, no swelling BL, ua dipped negative, pt given pitcher of water  1730-some uc's noted on the monitors, vs normal, report given to Dr Brown, discharge order received  6789-pt discharged home with PTL precautions and instructions to come back if she still feels like she has a UTI in the morning, verbalized understanding, encouraged pt to drink more water, verbalized understanding, left ambulatory on her own

## 2021-08-18 ENCOUNTER — TELEPHONE (OUTPATIENT)
Dept: OBGYN | Facility: CLINIC | Age: 36
End: 2021-08-18

## 2021-08-18 NOTE — TELEPHONE ENCOUNTER
Pt called stating that she is currently at a rehabilitation center where she lives with a group of other women. Stated one of the ladies was tested positive for COVID 2 days ago. Pt stated she already received her 2 covid vaccines a few months ago but she is worried for her 4 yr. old daughter who she will be seeing. Pt stated that in the rehabilitation center they have direct contact with the rest of the group due to they share the kitchen they are in the same room for the meetings. Pt wanted to know where she can get tested and what are the recommedations. Pt stated she is not having any symptoms. I consulted with Dr. Martinez and the recommendation is to wait at least 3 more days to get tested, nancydonyded to wait to see her daughter until she has the results, even though she is vaccinated for COVID she can still transmit COVID to her daughter. isolation is recommended and using a face mask. Instrusted pt. that the fastest way to get a COVID test is through an urgent care. Pt verbalized understanding and will comply with instructions. Pt had no further questions.

## 2021-08-24 ENCOUNTER — TELEPHONE (OUTPATIENT)
Dept: OBGYN | Facility: CLINIC | Age: 36
End: 2021-08-24

## 2021-08-24 ENCOUNTER — ROUTINE PRENATAL (OUTPATIENT)
Dept: OBGYN | Facility: CLINIC | Age: 36
End: 2021-08-24
Payer: MEDICAID

## 2021-08-24 VITALS — DIASTOLIC BLOOD PRESSURE: 80 MMHG | BODY MASS INDEX: 27.74 KG/M2 | WEIGHT: 177.1 LBS | SYSTOLIC BLOOD PRESSURE: 130 MMHG

## 2021-08-24 DIAGNOSIS — O09.899 SUPERVISION OF OTHER HIGH RISK PREGNANCY, ANTEPARTUM: Primary | ICD-10-CM

## 2021-08-24 DIAGNOSIS — O09.529 ANTEPARTUM MULTIGRAVIDA OF ADVANCED MATERNAL AGE: ICD-10-CM

## 2021-08-24 PROCEDURE — 90040 PR PRENATAL FOLLOW UP: CPT | Performed by: NURSE PRACTITIONER

## 2021-08-24 ASSESSMENT — FIBROSIS 4 INDEX: FIB4 SCORE: 0.91

## 2021-08-24 NOTE — TELEPHONE ENCOUNTER
Returning call from Andreia from [facility] on 8/23/2021 regarding a mutual Pt, Andreia would like to know when is the Pt's upcoming appt. Informed Andreia that Pt Minnie has an appt today @ 3:15 pm. Andreia states if there is any new concerns that they should be aware of. Advised Andreia that she can call back again after the Pt's visit today. Andreia agreed and has no further questions.

## 2021-08-24 NOTE — PROGRESS NOTES
OB follow up   + fetal movement. Active  No VB, LOF or UC's.  Phone #  [phone]  Preferred pharmacy confirmed.  No complaints as of today

## 2021-08-24 NOTE — PROGRESS NOTES
No complaints today  She is concerned about diabetes  3rd trimester labs ordered today and discussed  PTL precautions reviewed  Tdap, BTL, and FKC next visit  Rhogam next visit  All questions answered    Leona Velázquez CNM, APRN

## 2021-08-31 ENCOUNTER — TELEPHONE (OUTPATIENT)
Dept: OBGYN | Facility: CLINIC | Age: 36
End: 2021-08-31

## 2021-08-31 NOTE — TELEPHONE ENCOUNTER
Andreia from [facility] called in and asking if we can fax over the last visit progress note which was on 8/24/2021. Andreia provided fax # [phone]. Andreia also asked when is the Pt's upcoming appt, provided info. Advised Andreia that I will go ahead and fax it over. Scanned in media.   Osteomyelitis vs MM Osteomyelitis vs MM Osteomyelitis vs MM Osteomyelitis vs MM Osteomyelitis vs MM Osteomyelitis vs MM Osteomyelitis vs MM Osteomyelitis vs MM Osteomyelitis vs MM Osteomyelitis vs MM Osteomyelitis vs MM Osteomyelitis vs MM Osteomyelitis vs MM Osteomyelitis vs MM

## 2021-09-13 ENCOUNTER — HOSPITAL ENCOUNTER (OUTPATIENT)
Dept: LAB | Facility: MEDICAL CENTER | Age: 36
End: 2021-09-13
Attending: NURSE PRACTITIONER
Payer: MEDICAID

## 2021-09-13 ENCOUNTER — HOSPITAL ENCOUNTER (OUTPATIENT)
Dept: LAB | Facility: MEDICAL CENTER | Age: 36
End: 2021-09-13
Attending: OBSTETRICS & GYNECOLOGY
Payer: MEDICAID

## 2021-09-13 DIAGNOSIS — O09.899 SUPERVISION OF OTHER HIGH RISK PREGNANCY, ANTEPARTUM: ICD-10-CM

## 2021-09-13 DIAGNOSIS — R30.0 DYSURIA: ICD-10-CM

## 2021-09-13 LAB
APPEARANCE UR: CLEAR
BILIRUB UR QL STRIP.AUTO: NEGATIVE
COLOR UR: YELLOW
ERYTHROCYTE [DISTWIDTH] IN BLOOD BY AUTOMATED COUNT: 43.9 FL (ref 35.9–50)
GLUCOSE 1H P 50 G GLC PO SERPL-MCNC: 118 MG/DL (ref 70–139)
GLUCOSE UR STRIP.AUTO-MCNC: NEGATIVE MG/DL
HCT VFR BLD AUTO: 34.6 % (ref 37–47)
HGB BLD-MCNC: 11.6 G/DL (ref 12–16)
KETONES UR STRIP.AUTO-MCNC: NEGATIVE MG/DL
LEUKOCYTE ESTERASE UR QL STRIP.AUTO: NEGATIVE
MCH RBC QN AUTO: 31.6 PG (ref 27–33)
MCHC RBC AUTO-ENTMCNC: 33.5 G/DL (ref 33.6–35)
MCV RBC AUTO: 94.3 FL (ref 81.4–97.8)
MICRO URNS: NORMAL
NITRITE UR QL STRIP.AUTO: NEGATIVE
PH UR STRIP.AUTO: 7.5 [PH] (ref 5–8)
PLATELET # BLD AUTO: 165 K/UL (ref 164–446)
PMV BLD AUTO: 11.5 FL (ref 9–12.9)
PROT UR QL STRIP: NEGATIVE MG/DL
RBC # BLD AUTO: 3.67 M/UL (ref 4.2–5.4)
RBC UR QL AUTO: NEGATIVE
SP GR UR STRIP.AUTO: 1.02
TREPONEMA PALLIDUM IGG+IGM AB [PRESENCE] IN SERUM OR PLASMA BY IMMUNOASSAY: NORMAL
UROBILINOGEN UR STRIP.AUTO-MCNC: 0.2 MG/DL
WBC # BLD AUTO: 9 K/UL (ref 4.8–10.8)

## 2021-09-13 PROCEDURE — 85027 COMPLETE CBC AUTOMATED: CPT

## 2021-09-13 PROCEDURE — 86780 TREPONEMA PALLIDUM: CPT

## 2021-09-13 PROCEDURE — 81003 URINALYSIS AUTO W/O SCOPE: CPT

## 2021-09-13 PROCEDURE — 36415 COLL VENOUS BLD VENIPUNCTURE: CPT

## 2021-09-13 PROCEDURE — 82950 GLUCOSE TEST: CPT

## 2021-09-14 ENCOUNTER — ROUTINE PRENATAL (OUTPATIENT)
Dept: OBGYN | Facility: CLINIC | Age: 36
End: 2021-09-14
Payer: MEDICAID

## 2021-09-14 VITALS — BODY MASS INDEX: 29.13 KG/M2 | WEIGHT: 186 LBS | SYSTOLIC BLOOD PRESSURE: 126 MMHG | DIASTOLIC BLOOD PRESSURE: 74 MMHG

## 2021-09-14 DIAGNOSIS — O26.899 RH NEGATIVE STATE IN ANTEPARTUM PERIOD: ICD-10-CM

## 2021-09-14 DIAGNOSIS — Z67.91 RH NEGATIVE STATE IN ANTEPARTUM PERIOD: ICD-10-CM

## 2021-09-14 PROCEDURE — 90715 TDAP VACCINE 7 YRS/> IM: CPT | Performed by: NURSE PRACTITIONER

## 2021-09-14 PROCEDURE — 90040 PR PRENATAL FOLLOW UP: CPT | Performed by: NURSE PRACTITIONER

## 2021-09-14 PROCEDURE — 90471 IMMUNIZATION ADMIN: CPT | Performed by: NURSE PRACTITIONER

## 2021-09-14 ASSESSMENT — FIBROSIS 4 INDEX: FIB4 SCORE: 0.85

## 2021-09-14 NOTE — LETTER
"Count Your Baby's Movements  Another step to a healthy delivery    Chacho ParisiEY            Dept: [phone]    How Many Weeks Pregnant? 27w5d    Date to Begin Countin21              How to use this chart    One way for your physician to keep track of your baby's health is by knowing how often the baby moves (or \"kicks\") in your womb.  You can help your physician to do this by using this chart every day.    Every day, you should see how many hours it takes for your baby to move 10 times.  Start in the morning, as soon as you get up.    · First, write down the time your baby moves until you get to 10.  · Check off one box every time your baby moves until you get to 10.  · Write down the time you finished counting in the last column.  · Total how long it took to count up all 10 movements.  · Finally, fill in the box that shows how long this took.  After counting 10 movements, you no longer have to count any more that day.  The next morning, just start counting again as soon as you get up.    What should you call a \"movement\"?  It is hard to say, because it will feel different from one mother to another and from one pregnancy to the next.  The important thing is that you count the movements the same way throughout your pregnancy.  If you have more questions, you should ask your physician.    Count carefully every day!  SAMPLE:  Week 28    How many hours did it take to feel 10 movements?       Start  Time     1     2     3     4     5     6     7     8     9     10   Finish Time   Mon 8:20 ·  ·  ·  ·  ·  ·  ·  ·  ·  ·  11:40                  Sat               Sun                 IMPORTANT: You should contact your physician if it takes more than two hours for you to feel 10 movements.  Each morning, write down the time and start to count the movements of your baby.  Keep track by checking off one box every time you feel one movement.  When you have felt " "10 \"kicks\", write down the time you finished counting in the last column.  Then fill in the   box (over the check aguilar) for the number of hours it took.  Be sure to read the complete instructions on the previous page.            "

## 2021-09-14 NOTE — NON-PROVIDER
Pt here today for OB follow up  Pt states no complaints   Reports +FM  Good # [phone]  Pharmacy Confirmed.  Chaperone offered and not indicated.   Pt given JOURDAN sheet and instructions   Pt would like BTL, consent signed   Pt would like TDAP, consent signed

## 2021-09-14 NOTE — PROGRESS NOTES
SUBJECTIVE:  Pt is a 35 y.o.   at 27w5d  gestation. Presents today for follow-up prenatal care. Reports no issues at this time.  Reports good  fetal movement. Denies regular cramping/contractions, bleeding or leaking of fluid. Denies dysuria, headaches, N/V. Generally feels well today. Reports things are going well with her treatment and methadone.     OBJECTIVE:  - See prenatal vitals flow  -   Vitals:    21 1402   BP: 126/74   Weight: 84.4 kg (186 lb)                 ASSESSMENT:   - IUP at 27w5d    - S=D   -   Patient Active Problem List    Diagnosis Date Noted   • Supervision of other high risk pregnancy, antepartum 2021   • Anti-D antibodies present during pregnancy 2021   • History of drug use 2021   • History of seizures >10 years ago 2021   • Herpes genitalia 2021   • Rh negative status during pregnancy 2021   • Marginal placenta 2021   • Advanced maternal age in multigravida 2021         PLAN:  - S/sx pregnancy and labor warning signs vs general discomforts discussed  - Fetal movements and/or kick counts reviewed   - Adequate hydration reinforced  - Nutrition/exercise/vitamin education; continue PNV  - S/p Tdap vacc and rhogam today   - Third tri labs WNL  - F/u US for marginal previa scheduled   - Reviewed COVID-19 vaccination recommendations: pt reports she already got it  - Anticipatory guidance given  - RTC in 3 weeks for follow-up prenatal care

## 2021-10-14 ENCOUNTER — ROUTINE PRENATAL (OUTPATIENT)
Dept: OBGYN | Facility: CLINIC | Age: 36
End: 2021-10-14
Payer: MEDICAID

## 2021-10-14 ENCOUNTER — APPOINTMENT (OUTPATIENT)
Dept: RADIOLOGY | Facility: IMAGING CENTER | Age: 36
End: 2021-10-14
Payer: MEDICAID

## 2021-10-14 VITALS — SYSTOLIC BLOOD PRESSURE: 130 MMHG | DIASTOLIC BLOOD PRESSURE: 76 MMHG | BODY MASS INDEX: 29.44 KG/M2 | WEIGHT: 188 LBS

## 2021-10-14 DIAGNOSIS — Z34.83 ENCOUNTER FOR SUPERVISION OF OTHER NORMAL PREGNANCY, THIRD TRIMESTER: ICD-10-CM

## 2021-10-14 DIAGNOSIS — O44.20 MARGINAL PLACENTA: ICD-10-CM

## 2021-10-14 DIAGNOSIS — F19.91 HISTORY OF DRUG USE: ICD-10-CM

## 2021-10-14 PROCEDURE — 90040 PR PRENATAL FOLLOW UP: CPT | Performed by: ADVANCED PRACTICE MIDWIFE

## 2021-10-14 PROCEDURE — 76817 TRANSVAGINAL US OBSTETRIC: CPT | Mod: TC | Performed by: NURSE PRACTITIONER

## 2021-10-14 ASSESSMENT — FIBROSIS 4 INDEX: FIB4 SCORE: 0.85

## 2021-10-14 NOTE — PROGRESS NOTES
Patient here for MATILDA visit. Affirms fetal movement, denies vaginal bleeding. Rh negative and received Rhogam in pregnancy explaining Anti-D antibody presence. Taking methadone as provided by 145mg dosed once daily. Her current prescriber is Dr. FUENTES at [facility] and denies recent dose changes but did increase a few months ago.   Transvaginal US today to clear previa but results not available at time of appointment.    Aware of need for HSV suppression at or around 36 weeks gestation.  Last seizure years ago and likely substance related.     Due to history and use of methadone, growth US is ordered for patient. She will see physician at upcoming visit. We did discuss that lower fetal heart tones are normal after methadone dosing and she can expect increased activity in the afternoon. She doses every morning at 0600.

## 2021-10-14 NOTE — NON-PROVIDER
Pt here today for OB follow up  Pt states no complaints   Reports +FM  Good # [phone]  Pharmacy Confirmed.  Chaperone offered and not indictaed.   Pt would like flu vaccine at her next visit.

## 2021-10-15 ENCOUNTER — TELEPHONE (OUTPATIENT)
Dept: OBGYN | Facility: CLINIC | Age: 36
End: 2021-10-15

## 2021-10-15 DIAGNOSIS — O44.20 MARGINAL PLACENTA: Primary | ICD-10-CM

## 2021-10-15 NOTE — TELEPHONE ENCOUNTER
----- Message from MONSE Gutiérrez sent at 10/15/2021  1:26 AM PDT -----  +marginal placenta previa > repeat TVUS ordered for 2wks.   Continue pelvic rest      10/15/2021 1442 Left message for pt to call back regarding US results.   10/18/2021 1513 pt called back and notified of continue with marginal placenta previa, advised to be on pelvic rest, no lifting anything > 10lb, if notice VB to go to L&D ASAP. Pt will have an US to recheck placenta location in 2wks . Pt verbalized understanding. Pt transferred to Stephon SANDERS to schedule US appt.

## 2021-10-26 ENCOUNTER — APPOINTMENT (OUTPATIENT)
Dept: RADIOLOGY | Facility: IMAGING CENTER | Age: 36
End: 2021-10-26
Attending: ADVANCED PRACTICE MIDWIFE
Payer: MEDICAID

## 2021-10-26 ENCOUNTER — ROUTINE PRENATAL (OUTPATIENT)
Dept: OBGYN | Facility: CLINIC | Age: 36
End: 2021-10-26
Payer: MEDICAID

## 2021-10-26 VITALS — WEIGHT: 192 LBS | SYSTOLIC BLOOD PRESSURE: 110 MMHG | DIASTOLIC BLOOD PRESSURE: 68 MMHG | BODY MASS INDEX: 30.07 KG/M2

## 2021-10-26 DIAGNOSIS — O09.93 SUPERVISION OF HIGH RISK PREGNANCY IN THIRD TRIMESTER: ICD-10-CM

## 2021-10-26 DIAGNOSIS — F19.91 HISTORY OF DRUG USE: ICD-10-CM

## 2021-10-26 PROCEDURE — 76816 OB US FOLLOW-UP PER FETUS: CPT | Mod: TC | Performed by: ADVANCED PRACTICE MIDWIFE

## 2021-10-26 PROCEDURE — 90040 PR PRENATAL FOLLOW UP: CPT | Performed by: OBSTETRICS & GYNECOLOGY

## 2021-10-26 ASSESSMENT — FIBROSIS 4 INDEX: FIB4 SCORE: 0.85

## 2021-11-01 ENCOUNTER — HOSPITAL ENCOUNTER (EMERGENCY)
Facility: MEDICAL CENTER | Age: 36
End: 2021-11-01
Attending: OBSTETRICS & GYNECOLOGY | Admitting: OBSTETRICS & GYNECOLOGY
Payer: MEDICAID

## 2021-11-01 VITALS
BODY MASS INDEX: 28.88 KG/M2 | WEIGHT: 184 LBS | OXYGEN SATURATION: 96 % | SYSTOLIC BLOOD PRESSURE: 125 MMHG | RESPIRATION RATE: 16 BRPM | HEART RATE: 66 BPM | TEMPERATURE: 98 F | DIASTOLIC BLOOD PRESSURE: 61 MMHG | HEIGHT: 67 IN

## 2021-11-01 LAB
A1 MICROGLOB PLACENTAL VAG QL: NEGATIVE
APPEARANCE UR: CLEAR
COLOR UR AUTO: YELLOW
CRYSTALS AMN MICRO: NORMAL
GLUCOSE UR QL STRIP.AUTO: NEGATIVE MG/DL
KETONES UR QL STRIP.AUTO: NEGATIVE MG/DL
LEUKOCYTE ESTERASE UR QL STRIP.AUTO: ABNORMAL
NITRITE UR QL STRIP.AUTO: NEGATIVE
PH UR STRIP.AUTO: 6.5 [PH] (ref 5–8)
PROT UR QL STRIP: ABNORMAL MG/DL
RBC UR QL AUTO: ABNORMAL
SP GR UR STRIP.AUTO: >=1.03 (ref 1–1.03)

## 2021-11-01 PROCEDURE — 81002 URINALYSIS NONAUTO W/O SCOPE: CPT

## 2021-11-01 PROCEDURE — 99283 EMERGENCY DEPT VISIT LOW MDM: CPT

## 2021-11-01 PROCEDURE — 59025 FETAL NON-STRESS TEST: CPT

## 2021-11-01 PROCEDURE — 84112 EVAL AMNIOTIC FLUID PROTEIN: CPT

## 2021-11-01 PROCEDURE — 59025 FETAL NON-STRESS TEST: CPT | Mod: 26 | Performed by: PHYSICIAN ASSISTANT

## 2021-11-01 PROCEDURE — 89060 EXAM SYNOVIAL FLUID CRYSTALS: CPT

## 2021-11-01 ASSESSMENT — FIBROSIS 4 INDEX: FIB4 SCORE: 0.85

## 2021-11-02 NOTE — ED PROVIDER NOTES
Emergency Obstetric Consultation     Date of Service  2021    Reason for Consultation  Chief Complaint   Patient presents with   • Rupture of Membranes     since 1730       History of Presenting Illness  35 y.o. female who presented 2021 with c/o ROM with occ UCs. Denies VB. +FM.    Review of Systems  ROS    Obstetric History    OB History    Para Term  AB Living   4 2 2   1 1   SAB TAB Ectopic Molar Multiple Live Births   1         1      # Outcome Date GA Lbr Pedro Pablo/2nd Weight Sex Delivery Anes PTL Lv   4 Current            3 Term 17 38w0d  2.96 kg (6 lb 8.4 oz) F Vag-Spont      2 Term 06 40w1d  3.175 kg (7 lb) F Vag-Spont EPI N THI      Birth Comments: meconium    1 SAB               Obstetric Comments   Pregnancy unplanned but desired. FOB not involved. Pt does not work outside of home at this time.        Gynecologic History  No LMP recorded (lmp unknown). Patient is pregnant.    Medical History  Past Medical History:   Diagnosis Date   • Anxiety    • Asthma     as a child   • Borderline personality disorder (HCC)    • Depression    • Heart murmur     at age 5or 6    • Rh negative status during pregnancy 2021       Surgical History   has a past surgical history that includes cholangiograms (N/A, 2016) and richard by laparoscopy (N/A, 2016).    Family History  family history includes Diabetes in her paternal grandmother; Heart Disease in her father, mother, and paternal grandmother.    Social History   reports that she has quit smoking. Her smoking use included cigarettes. She has a 4.50 pack-year smoking history. She has never used smokeless tobacco. She reports current drug use. Drugs: Marijuana, Methamphetamines, Cocaine, IV, and Intravenous. She reports that she does not drink alcohol.    Medications  Medications Prior to Admission   Medication Sig Dispense Refill Last Dose   • Prenatal Vit-Fe Sulfate-FA-DHA (PRENATAL VITAMIN/MIN +DHA) 27-0.8-200 MG Cap  Take 1 tablet by mouth every day. 30 capsule 10    • valACYclovir (VALTREX) 500 MG Tab Take 1 tablet by mouth every day. 60 tablet 4    • methadone (DOLOPHINE) 5 MG Tab Take 145 mg by mouth every morning before breakfast. Once a day      • nicotine polacrilex (HM NICOTINE POLACRILEX) 4 MG lozenge Place 1 Lozenge into mouth between cheek and gum every 2 hours. 168 Lozenge 0        Allergies  Allergies   Allergen Reactions   • Aspirin Hives     Reaction is to high dose aspiring,  Tolerated Toradol 04/04/16, patient states can tolerate Ibuprofen       Physical Exam  Exam - Fern and amnisure neg.   NST- Cat 1 per my read, 140bpm, +accels, no decels, mod variability.  TOCO: No UCs seen.    Laboratory  - UA: tr leuk, tr blood               No results for input(s): NTPROBNP in the last 72 hours.         Imaging  None    Assessment & Plan  Assessment & Plan - IUP at 34w4d - membranes intact, not in labor. Daily FKC recommended. Labor precautions reviewed. F/u RWH 1 wk or sooner prn.         JIM Lincoln.

## 2021-11-02 NOTE — PROGRESS NOTES
"Late entry due to patient care    Patient is a  at 34weeks and 4days, EDC of . Arrived to unit and placed in LDA 3, RN placed EFM and toco to ensure fetal well being and monitor uterine activity. RN educated patient on need for monitors and patient verbalized understanding. Vital signs taken. Available prenatal labs / records reviewed by RN. Patient's chief complaint \"I think my water broke at 1730 today when I was making dinner\".  Patient denies vaginal bleeding, recent intercourse, and reports positive fetal movement.    [facility] report given with an MFTI of 3,  & para reviewed, bps, fhr, contraction pattern reviewed, patient has a marginal previa, orders received for sse, a fern and if questionable srom then amnisure, first bp reported, bps cycling.    [facility], bps reviewed and urine poc discussed, d/c orders received.    RN at bedside, discharge instructions reviewed and given to patient and significant other, all questions answered. Patient discharged in stable condition, with significant other,  given pre labor precautions. Patient ambulated to private car with significant other. Handwashing and discharge instructions given.  All questions answered and patient knows to keep her scheduled appointment with her OB.   "

## 2021-11-12 ENCOUNTER — HOSPITAL ENCOUNTER (OUTPATIENT)
Facility: MEDICAL CENTER | Age: 36
End: 2021-11-12
Attending: NURSE PRACTITIONER
Payer: MEDICAID

## 2021-11-12 ENCOUNTER — ROUTINE PRENATAL (OUTPATIENT)
Dept: OBGYN | Facility: CLINIC | Age: 36
End: 2021-11-12
Payer: MEDICAID

## 2021-11-12 VITALS — BODY MASS INDEX: 31.01 KG/M2 | SYSTOLIC BLOOD PRESSURE: 164 MMHG | DIASTOLIC BLOOD PRESSURE: 86 MMHG | WEIGHT: 198 LBS

## 2021-11-12 DIAGNOSIS — O09.899 SUPERVISION OF OTHER HIGH RISK PREGNANCY, ANTEPARTUM: ICD-10-CM

## 2021-11-12 DIAGNOSIS — O09.899 SUPERVISION OF OTHER HIGH RISK PREGNANCY, ANTEPARTUM: Primary | ICD-10-CM

## 2021-11-12 DIAGNOSIS — O44.20 MARGINAL PLACENTA: ICD-10-CM

## 2021-11-12 DIAGNOSIS — F11.20 METHADONE MAINTENANCE TREATMENT AFFECTING PREGNANCY IN THIRD TRIMESTER (HCC): ICD-10-CM

## 2021-11-12 DIAGNOSIS — F19.91 HISTORY OF DRUG USE: ICD-10-CM

## 2021-11-12 DIAGNOSIS — A60.00 GENITAL HERPES SIMPLEX, UNSPECIFIED SITE: ICD-10-CM

## 2021-11-12 DIAGNOSIS — O36.8390 FETAL BRADYCARDIA, ANTEPARTUM CONDITION OR COMPLICATION: ICD-10-CM

## 2021-11-12 DIAGNOSIS — O99.323 METHADONE MAINTENANCE TREATMENT AFFECTING PREGNANCY IN THIRD TRIMESTER (HCC): ICD-10-CM

## 2021-11-12 DIAGNOSIS — O16.3 ELEVATED BLOOD PRESSURE AFFECTING PREGNANCY IN THIRD TRIMESTER, ANTEPARTUM: ICD-10-CM

## 2021-11-12 LAB
NST ACOUSTIC STIMULATION: NORMAL
NST ACTION NECESSARY: NORMAL
NST ASSESSMENT: NORMAL
NST BASELINE: NORMAL
NST INDICATIONS: NORMAL
NST OTHER DATA: NORMAL
NST READ BY: NORMAL
NST RETURN: NORMAL
NST UTERINE ACTIVITY: NORMAL

## 2021-11-12 PROCEDURE — 90686 IIV4 VACC NO PRSV 0.5 ML IM: CPT | Performed by: NURSE PRACTITIONER

## 2021-11-12 PROCEDURE — 90471 IMMUNIZATION ADMIN: CPT | Performed by: NURSE PRACTITIONER

## 2021-11-12 PROCEDURE — 90040 PR PRENATAL FOLLOW UP: CPT | Performed by: NURSE PRACTITIONER

## 2021-11-12 PROCEDURE — 87150 DNA/RNA AMPLIFIED PROBE: CPT

## 2021-11-12 PROCEDURE — 87081 CULTURE SCREEN ONLY: CPT

## 2021-11-12 ASSESSMENT — FIBROSIS 4 INDEX: FIB4 SCORE: 0.88

## 2021-11-12 NOTE — PROGRESS NOTES
S:  No c/o.  Was seen and sent home from L&D for questionable LOF.  Denies any HA, blurry vision or epigastric pain.  Reports good FM.  Denies VB, LOF, RUCs, or vaginal DC.     O:    Vitals:    11/12/21 1324   BP: (!) 164/86   Weight: 89.8 kg (198 lb)     See flow sheet.    A:  IUP at 36w1d  Patient Active Problem List    Diagnosis Date Noted   • Supervision of other high risk pregnancy, antepartum 08/24/2021   • Anti-D antibodies present during pregnancy due to Rhogam 07/16/2021   • History of drug use 07/09/2021   • History of seizures >10 years ago 07/09/2021   • Herpes genitalia 07/09/2021   • Rh negative status during pregnancy 07/09/2021   • Marginal placenta 07/09/2021   • Advanced maternal age in multigravida 07/07/2021       P:  1.  GBS obtained.          2.  Attempted to obtain NST, but pt took self off monitor because her ride was here.  Pt instructed to go to L&D for further monitoring and labs.  Pt verbalized understanding. Report called to resident.    Chaperone offered and provided by Erin Prather MA.

## 2021-11-12 NOTE — PROGRESS NOTES
OB follow up   + fetal movement.  No VB, LOF or UC's.  Phone # [phone]  Preferred pharmacy confirmed.  GBS today  Flu vaccine given.R Deltoid. Screening checklist reviewed with patient. VIS given. Verified by ac

## 2021-11-13 ENCOUNTER — ANESTHESIA (OUTPATIENT)
Dept: ANESTHESIOLOGY | Facility: MEDICAL CENTER | Age: 36
End: 2021-11-13
Payer: MEDICAID

## 2021-11-13 ENCOUNTER — ANESTHESIA EVENT (OUTPATIENT)
Dept: ANESTHESIOLOGY | Facility: MEDICAL CENTER | Age: 36
End: 2021-11-13
Payer: MEDICAID

## 2021-11-13 ENCOUNTER — APPOINTMENT (OUTPATIENT)
Dept: RADIOLOGY | Facility: MEDICAL CENTER | Age: 36
End: 2021-11-13
Attending: OBSTETRICS & GYNECOLOGY
Payer: MEDICAID

## 2021-11-13 ENCOUNTER — HOSPITAL ENCOUNTER (INPATIENT)
Facility: MEDICAL CENTER | Age: 36
LOS: 3 days | End: 2021-11-16
Attending: OBSTETRICS & GYNECOLOGY | Admitting: OBSTETRICS & GYNECOLOGY
Payer: MEDICAID

## 2021-11-13 LAB
ALBUMIN SERPL BCP-MCNC: 3.2 G/DL (ref 3.2–4.9)
ALBUMIN/GLOB SERPL: 1.3 G/DL
ALP SERPL-CCNC: 169 U/L (ref 30–99)
ALT SERPL-CCNC: 25 U/L (ref 2–50)
AMPHET UR QL SCN: NEGATIVE
ANION GAP SERPL CALC-SCNC: 11 MMOL/L (ref 7–16)
APPEARANCE UR: ABNORMAL
AST SERPL-CCNC: 25 U/L (ref 12–45)
BACTERIA #/AREA URNS HPF: ABNORMAL /HPF
BARBITURATES UR QL SCN: NEGATIVE
BENZODIAZ UR QL SCN: NEGATIVE
BILIRUB SERPL-MCNC: <0.2 MG/DL (ref 0.1–1.5)
BILIRUB UR QL STRIP.AUTO: ABNORMAL
BUN SERPL-MCNC: 16 MG/DL (ref 8–22)
BZE UR QL SCN: NEGATIVE
CALCIUM SERPL-MCNC: 8.7 MG/DL (ref 8.5–10.5)
CANNABINOIDS UR QL SCN: NEGATIVE
CAOX CRY #/AREA URNS HPF: ABNORMAL /HPF
CHLORIDE SERPL-SCNC: 106 MMOL/L (ref 96–112)
CO2 SERPL-SCNC: 21 MMOL/L (ref 20–33)
COLOR UR: ABNORMAL
CREAT SERPL-MCNC: 0.64 MG/DL (ref 0.5–1.4)
CREAT UR-MCNC: 224.91 MG/DL
EPI CELLS #/AREA URNS HPF: ABNORMAL /HPF
ERYTHROCYTE [DISTWIDTH] IN BLOOD BY AUTOMATED COUNT: 44 FL (ref 35.9–50)
GLOBULIN SER CALC-MCNC: 2.5 G/DL (ref 1.9–3.5)
GLUCOSE SERPL-MCNC: 105 MG/DL (ref 65–99)
GLUCOSE UR STRIP.AUTO-MCNC: NEGATIVE MG/DL
HCT VFR BLD AUTO: 31.8 % (ref 37–47)
HGB BLD-MCNC: 10.8 G/DL (ref 12–16)
HYALINE CASTS #/AREA URNS LPF: ABNORMAL /LPF
KETONES UR STRIP.AUTO-MCNC: ABNORMAL MG/DL
LEUKOCYTE ESTERASE UR QL STRIP.AUTO: ABNORMAL
MAGNESIUM SERPL-MCNC: 4.8 MG/DL (ref 1.5–2.5)
MCH RBC QN AUTO: 31.9 PG (ref 27–33)
MCHC RBC AUTO-ENTMCNC: 34 G/DL (ref 33.6–35)
MCV RBC AUTO: 93.8 FL (ref 81.4–97.8)
METHADONE UR QL SCN: POSITIVE
MICRO URNS: ABNORMAL
NITRITE UR QL STRIP.AUTO: NEGATIVE
OPIATES UR QL SCN: NEGATIVE
OXYCODONE UR QL SCN: NEGATIVE
PCP UR QL SCN: POSITIVE
PH UR STRIP.AUTO: 5.5 [PH] (ref 5–8)
PLATELET # BLD AUTO: 119 K/UL (ref 164–446)
PMV BLD AUTO: 12 FL (ref 9–12.9)
POTASSIUM SERPL-SCNC: 4.3 MMOL/L (ref 3.6–5.5)
PROPOXYPH UR QL SCN: NEGATIVE
PROT SERPL-MCNC: 5.7 G/DL (ref 6–8.2)
PROT UR QL STRIP: 30 MG/DL
PROT UR-MCNC: 42 MG/DL (ref 0–15)
PROT/CREAT UR: 187 MG/G (ref 10–107)
RBC # BLD AUTO: 3.39 M/UL (ref 4.2–5.4)
RBC # URNS HPF: ABNORMAL /HPF
RBC UR QL AUTO: NEGATIVE
SARS-COV+SARS-COV-2 AG RESP QL IA.RAPID: NOTDETECTED
SODIUM SERPL-SCNC: 138 MMOL/L (ref 135–145)
SP GR UR STRIP.AUTO: 1.04
SPECIMEN SOURCE: NORMAL
UROBILINOGEN UR STRIP.AUTO-MCNC: 0.2 MG/DL
WBC # BLD AUTO: 6.7 K/UL (ref 4.8–10.8)
WBC #/AREA URNS HPF: ABNORMAL /HPF

## 2021-11-13 PROCEDURE — 3E033VJ INTRODUCTION OF OTHER HORMONE INTO PERIPHERAL VEIN, PERCUTANEOUS APPROACH: ICD-10-PCS | Performed by: OBSTETRICS & GYNECOLOGY

## 2021-11-13 PROCEDURE — 700111 HCHG RX REV CODE 636 W/ 250 OVERRIDE (IP)

## 2021-11-13 PROCEDURE — 76817 TRANSVAGINAL US OBSTETRIC: CPT

## 2021-11-13 PROCEDURE — 99285 EMERGENCY DEPT VISIT HI MDM: CPT

## 2021-11-13 PROCEDURE — 3E0S3BZ INTRODUCTION OF ANESTHETIC AGENT INTO EPIDURAL SPACE, PERCUTANEOUS APPROACH: ICD-10-PCS | Performed by: ANESTHESIOLOGY

## 2021-11-13 PROCEDURE — 36415 COLL VENOUS BLD VENIPUNCTURE: CPT

## 2021-11-13 PROCEDURE — A9270 NON-COVERED ITEM OR SERVICE: HCPCS | Performed by: NURSE PRACTITIONER

## 2021-11-13 PROCEDURE — 10907ZC DRAINAGE OF AMNIOTIC FLUID, THERAPEUTIC FROM PRODUCTS OF CONCEPTION, VIA NATURAL OR ARTIFICIAL OPENING: ICD-10-PCS | Performed by: OBSTETRICS & GYNECOLOGY

## 2021-11-13 PROCEDURE — 85027 COMPLETE CBC AUTOMATED: CPT

## 2021-11-13 PROCEDURE — 84156 ASSAY OF PROTEIN URINE: CPT

## 2021-11-13 PROCEDURE — 303615 HCHG EPIDURAL/SPINAL ANESTHESIA FOR LABOR

## 2021-11-13 PROCEDURE — A9270 NON-COVERED ITEM OR SERVICE: HCPCS

## 2021-11-13 PROCEDURE — 700102 HCHG RX REV CODE 250 W/ 637 OVERRIDE(OP)

## 2021-11-13 PROCEDURE — 770002 HCHG ROOM/CARE - OB PRIVATE (112)

## 2021-11-13 PROCEDURE — 83735 ASSAY OF MAGNESIUM: CPT

## 2021-11-13 PROCEDURE — 700102 HCHG RX REV CODE 250 W/ 637 OVERRIDE(OP): Performed by: NURSE PRACTITIONER

## 2021-11-13 PROCEDURE — 700111 HCHG RX REV CODE 636 W/ 250 OVERRIDE (IP): Performed by: ANESTHESIOLOGY

## 2021-11-13 PROCEDURE — 80307 DRUG TEST PRSMV CHEM ANLYZR: CPT

## 2021-11-13 PROCEDURE — 81001 URINALYSIS AUTO W/SCOPE: CPT

## 2021-11-13 PROCEDURE — 80053 COMPREHEN METABOLIC PANEL: CPT

## 2021-11-13 PROCEDURE — 700105 HCHG RX REV CODE 258

## 2021-11-13 PROCEDURE — 82570 ASSAY OF URINE CREATININE: CPT

## 2021-11-13 PROCEDURE — 87426 SARSCOV CORONAVIRUS AG IA: CPT

## 2021-11-13 PROCEDURE — 700101 HCHG RX REV CODE 250: Performed by: OBSTETRICS & GYNECOLOGY

## 2021-11-13 PROCEDURE — 700105 HCHG RX REV CODE 258: Performed by: OBSTETRICS & GYNECOLOGY

## 2021-11-13 RX ORDER — IBUPROFEN 600 MG/1
600 TABLET ORAL ONCE
Status: ON HOLD | COMMUNITY
End: 2021-11-16

## 2021-11-13 RX ORDER — METHADONE HYDROCHLORIDE 5 MG/1
5 TABLET ORAL DAILY
Status: DISCONTINUED | OUTPATIENT
Start: 2021-11-14 | End: 2021-11-16 | Stop reason: HOSPADM

## 2021-11-13 RX ORDER — ONDANSETRON 4 MG/1
4 TABLET, ORALLY DISINTEGRATING ORAL EVERY 6 HOURS PRN
Status: DISCONTINUED | OUTPATIENT
Start: 2021-11-13 | End: 2021-11-16 | Stop reason: HOSPADM

## 2021-11-13 RX ORDER — MAGNESIUM SULFATE HEPTAHYDRATE 40 MG/ML
2 INJECTION, SOLUTION INTRAVENOUS ONCE
Status: COMPLETED | OUTPATIENT
Start: 2021-11-13 | End: 2021-11-13

## 2021-11-13 RX ORDER — SODIUM CHLORIDE, SODIUM LACTATE, POTASSIUM CHLORIDE, AND CALCIUM CHLORIDE .6; .31; .03; .02 G/100ML; G/100ML; G/100ML; G/100ML
1000 INJECTION, SOLUTION INTRAVENOUS
Status: DISCONTINUED | OUTPATIENT
Start: 2021-11-13 | End: 2021-11-14 | Stop reason: HOSPADM

## 2021-11-13 RX ORDER — METHADONE HYDROCHLORIDE 40 MG/1
145 TABLET ORAL
Status: DISCONTINUED | OUTPATIENT
Start: 2021-11-14 | End: 2021-11-13

## 2021-11-13 RX ORDER — MAGNESIUM SULFATE HEPTAHYDRATE 40 MG/ML
2 INJECTION, SOLUTION INTRAVENOUS CONTINUOUS
Status: DISCONTINUED | OUTPATIENT
Start: 2021-11-13 | End: 2021-11-15

## 2021-11-13 RX ORDER — NICOTINE 21 MG/24HR
14 PATCH, TRANSDERMAL 24 HOURS TRANSDERMAL
Status: DISCONTINUED | OUTPATIENT
Start: 2021-11-14 | End: 2021-11-13

## 2021-11-13 RX ORDER — HYDRALAZINE HYDROCHLORIDE 20 MG/ML
5-10 INJECTION INTRAMUSCULAR; INTRAVENOUS PRN
Status: DISCONTINUED | OUTPATIENT
Start: 2021-11-13 | End: 2021-11-14

## 2021-11-13 RX ORDER — LABETALOL HYDROCHLORIDE 5 MG/ML
20-80 INJECTION, SOLUTION INTRAVENOUS PRN
Status: DISCONTINUED | OUTPATIENT
Start: 2021-11-13 | End: 2021-11-14

## 2021-11-13 RX ORDER — ROPIVACAINE HYDROCHLORIDE 2 MG/ML
INJECTION, SOLUTION EPIDURAL; INFILTRATION
Status: DISCONTINUED | OUTPATIENT
Start: 2021-11-13 | End: 2021-11-14 | Stop reason: SURG

## 2021-11-13 RX ORDER — METHADONE HYDROCHLORIDE 40 MG/1
20 TABLET ORAL DAILY
Status: DISCONTINUED | OUTPATIENT
Start: 2021-11-14 | End: 2021-11-16 | Stop reason: HOSPADM

## 2021-11-13 RX ORDER — MAGNESIUM SULFATE HEPTAHYDRATE 40 MG/ML
4 INJECTION, SOLUTION INTRAVENOUS ONCE
Status: COMPLETED | OUTPATIENT
Start: 2021-11-13 | End: 2021-11-13

## 2021-11-13 RX ORDER — ONDANSETRON 2 MG/ML
4 INJECTION INTRAMUSCULAR; INTRAVENOUS EVERY 6 HOURS PRN
Status: DISCONTINUED | OUTPATIENT
Start: 2021-11-13 | End: 2021-11-14

## 2021-11-13 RX ORDER — BUPIVACAINE HYDROCHLORIDE 2.5 MG/ML
INJECTION, SOLUTION EPIDURAL; INFILTRATION; INTRACAUDAL
Status: COMPLETED
Start: 2021-11-13 | End: 2021-11-13

## 2021-11-13 RX ORDER — ACETAMINOPHEN 325 MG/1
650 TABLET ORAL EVERY 4 HOURS PRN
Status: DISCONTINUED | OUTPATIENT
Start: 2021-11-13 | End: 2021-11-14

## 2021-11-13 RX ORDER — ROPIVACAINE HYDROCHLORIDE 2 MG/ML
INJECTION, SOLUTION EPIDURAL; INFILTRATION; PERINEURAL CONTINUOUS
Status: DISCONTINUED | OUTPATIENT
Start: 2021-11-13 | End: 2021-11-14

## 2021-11-13 RX ORDER — BUPIVACAINE HYDROCHLORIDE 2.5 MG/ML
INJECTION, SOLUTION EPIDURAL; INFILTRATION; INTRACAUDAL PRN
Status: DISCONTINUED | OUTPATIENT
Start: 2021-11-13 | End: 2021-11-14 | Stop reason: SURG

## 2021-11-13 RX ORDER — ACETAMINOPHEN 500 MG
1000 TABLET ORAL EVERY 6 HOURS PRN
Status: DISCONTINUED | OUTPATIENT
Start: 2021-11-13 | End: 2021-11-13

## 2021-11-13 RX ORDER — METHADONE HYDROCHLORIDE 40 MG/1
120 TABLET ORAL DAILY
Status: DISCONTINUED | OUTPATIENT
Start: 2021-11-14 | End: 2021-11-16 | Stop reason: HOSPADM

## 2021-11-13 RX ORDER — CALCIUM GLUCONATE 94 MG/ML
1 INJECTION, SOLUTION INTRAVENOUS
Status: DISCONTINUED | OUTPATIENT
Start: 2021-11-13 | End: 2021-11-14 | Stop reason: HOSPADM

## 2021-11-13 RX ORDER — SODIUM CHLORIDE, SODIUM LACTATE, POTASSIUM CHLORIDE, AND CALCIUM CHLORIDE .6; .31; .03; .02 G/100ML; G/100ML; G/100ML; G/100ML
250 INJECTION, SOLUTION INTRAVENOUS PRN
Status: DISCONTINUED | OUTPATIENT
Start: 2021-11-13 | End: 2021-11-14 | Stop reason: HOSPADM

## 2021-11-13 RX ORDER — SODIUM CHLORIDE, SODIUM LACTATE, POTASSIUM CHLORIDE, CALCIUM CHLORIDE 600; 310; 30; 20 MG/100ML; MG/100ML; MG/100ML; MG/100ML
INJECTION, SOLUTION INTRAVENOUS CONTINUOUS
Status: DISCONTINUED | OUTPATIENT
Start: 2021-11-13 | End: 2021-11-16 | Stop reason: HOSPADM

## 2021-11-13 RX ORDER — LABETALOL HYDROCHLORIDE 5 MG/ML
INJECTION, SOLUTION INTRAVENOUS
Status: DISCONTINUED
Start: 2021-11-13 | End: 2021-11-13

## 2021-11-13 RX ORDER — NICOTINE 21 MG/24HR
14 PATCH, TRANSDERMAL 24 HOURS TRANSDERMAL
Status: DISCONTINUED | OUTPATIENT
Start: 2021-11-13 | End: 2021-11-16 | Stop reason: HOSPADM

## 2021-11-13 RX ORDER — OXYTOCIN 10 [USP'U]/ML
10 INJECTION, SOLUTION INTRAMUSCULAR; INTRAVENOUS
Status: DISCONTINUED | OUTPATIENT
Start: 2021-11-13 | End: 2021-11-14 | Stop reason: HOSPADM

## 2021-11-13 RX ORDER — ROPIVACAINE HYDROCHLORIDE 2 MG/ML
INJECTION, SOLUTION EPIDURAL; INFILTRATION; PERINEURAL
Status: COMPLETED
Start: 2021-11-13 | End: 2021-11-13

## 2021-11-13 RX ADMIN — MAGNESIUM SULFATE HEPTAHYDRATE 4 G: 40 INJECTION, SOLUTION INTRAVENOUS at 14:17

## 2021-11-13 RX ADMIN — ROPIVACAINE HYDROCHLORIDE: 2 INJECTION, SOLUTION EPIDURAL; INFILTRATION; PERINEURAL at 22:00

## 2021-11-13 RX ADMIN — SODIUM CHLORIDE 2.5 MILLION UNITS: 9 INJECTION, SOLUTION INTRAVENOUS at 22:39

## 2021-11-13 RX ADMIN — MAGNESIUM SULFATE HEPTAHYDRATE 2 G/HR: 40 INJECTION, SOLUTION INTRAVENOUS at 14:46

## 2021-11-13 RX ADMIN — OXYTOCIN 2 MILLI-UNITS/MIN: 10 INJECTION, SOLUTION INTRAMUSCULAR; INTRAVENOUS at 18:05

## 2021-11-13 RX ADMIN — SODIUM CHLORIDE 5 MILLION UNITS: 900 INJECTION INTRAVENOUS at 17:56

## 2021-11-13 RX ADMIN — ACETAMINOPHEN 1000 MG: 500 TABLET ORAL at 15:53

## 2021-11-13 RX ADMIN — ONDANSETRON 4 MG: 2 INJECTION INTRAMUSCULAR; INTRAVENOUS at 14:41

## 2021-11-13 RX ADMIN — FENTANYL CITRATE 100 MCG: 50 INJECTION, SOLUTION INTRAMUSCULAR; INTRAVENOUS at 22:00

## 2021-11-13 RX ADMIN — ROPIVACAINE HYDROCHLORIDE: 2 INJECTION, SOLUTION EPIDURAL; INFILTRATION at 22:00

## 2021-11-13 RX ADMIN — SODIUM CHLORIDE, POTASSIUM CHLORIDE, SODIUM LACTATE AND CALCIUM CHLORIDE: 600; 310; 30; 20 INJECTION, SOLUTION INTRAVENOUS at 14:16

## 2021-11-13 RX ADMIN — ACETAMINOPHEN 650 MG: 325 TABLET, FILM COATED ORAL at 22:18

## 2021-11-13 RX ADMIN — NICOTINE 14 MG: 14 PATCH TRANSDERMAL at 20:31

## 2021-11-13 RX ADMIN — LABETALOL HYDROCHLORIDE 20 MG: 5 INJECTION, SOLUTION INTRAVENOUS at 14:41

## 2021-11-13 RX ADMIN — ROPIVACAINE HYDROCHLORIDE 10 ML/HR: 2 INJECTION, SOLUTION EPIDURAL; INFILTRATION at 22:04

## 2021-11-13 RX ADMIN — LABETALOL HYDROCHLORIDE 20 MG: 5 INJECTION, SOLUTION INTRAVENOUS at 21:19

## 2021-11-13 RX ADMIN — MAGNESIUM SULFATE HEPTAHYDRATE 2 G: 40 INJECTION, SOLUTION INTRAVENOUS at 14:32

## 2021-11-13 RX ADMIN — BUPIVACAINE HYDROCHLORIDE 10 ML: 2.5 INJECTION, SOLUTION EPIDURAL; INFILTRATION; INTRACAUDAL; PERINEURAL at 22:00

## 2021-11-13 ASSESSMENT — COPD QUESTIONNAIRES
COPD SCREENING SCORE: 2
DO YOU EVER COUGH UP ANY MUCUS OR PHLEGM?: NO/ONLY WITH OCCASIONAL COLDS OR INFECTIONS
HAVE YOU SMOKED AT LEAST 100 CIGARETTES IN YOUR ENTIRE LIFE: YES
IN THE PAST 12 MONTHS DO YOU DO LESS THAN YOU USED TO BECAUSE OF YOUR BREATHING PROBLEMS: DISAGREE/UNSURE
DURING THE PAST 4 WEEKS HOW MUCH DID YOU FEEL SHORT OF BREATH: NONE/LITTLE OF THE TIME

## 2021-11-13 ASSESSMENT — LIFESTYLE VARIABLES
ALCOHOL_USE: NO
EVER_SMOKED: YES

## 2021-11-13 ASSESSMENT — PATIENT HEALTH QUESTIONNAIRE - PHQ9
SUM OF ALL RESPONSES TO PHQ9 QUESTIONS 1 AND 2: 0
1. LITTLE INTEREST OR PLEASURE IN DOING THINGS: NOT AT ALL
2. FEELING DOWN, DEPRESSED, IRRITABLE, OR HOPELESS: NOT AT ALL

## 2021-11-13 ASSESSMENT — FIBROSIS 4 INDEX: FIB4 SCORE: 0.88

## 2021-11-13 ASSESSMENT — PAIN SCALES - GENERAL: PAINLEVEL: 0 - NO PAIN

## 2021-11-13 NOTE — PROGRESS NOTES
Patient comes in with concerns that she was in the office yesterday and had elevated bp and was instructed to follow up on labor and delivery.  She comes in today with concerns that she had elevated BP at home.  She had a headache when she woke up this morning and took 600mg ibuprofen with improvement of headache.      She is also concerned that a previous note on 11/1 reports that she has current drug use.  The patient corrects and clarifies that information and states that she has been clean since 6/2021 and is currently on methadone.     Elevated bp's noted.  Dr Brown notified.  Labs ordered.  IV started.      BP's reviewed with Dr Aden/Dr Brown, patient to be admitted for mag and IOL.  Patient moved to labor room.  Mag started.  Bp's noted to be elevated see flowsheet, Dr Brown notified, hypertensive protocol ordered, labetalol given, with decrease in bp.  Osborne placed.      Report given to Michell LARA.

## 2021-11-13 NOTE — H&P
OB H&P:    CC: IOL    HPI:  Ms. Minnie Sherman is a 36 y.o.  @ 36w2d by 16-week ultrasound, presenting today for elevated BP.  Yesterday she had an office visit, her blood pressure was measured to be elevated, and it was recommended that she come to the hospital.  She went home, and then came to the hospital today for treatment.  Her blood pressure was measured at 154/93 yesterday, but today in triage it was measured to be 160s over 80s.  Patient reports that she is doing well.  She had a headache this morning, which went away when she took ibuprofen.  She denies vision changes, abdominal pain, fever/chills.    Patient reports that she has a history of heroin use 4 years ago, but she has been on methadone since.  She denies drug or alcohol use since then.  She reports history of herpes, for which she takes Valtrex, she denies any recent outbreaks.  Patient reports that she was diagnosed with hepatitis C, was successfully treated 4 and half years ago, and has since tested negative.  She quit smoking 4 months ago, she was smoking 1 pack/week before quitting, since then has been using nicotine gum.    Contractions: No   Loss of fluid: No   Vaginal bleeding: No   Fetal movement: present      PNC with [facility]    PNL:  Rh negative, RI, HIV neg, TrepAb neg, HBsAg NR, GC/CT neg/neg  Glucola: negative for GDM  GBS neg      ROS:  Const: denies fevers, general concerns  CV/resp: reports no concerns  GI: denies abd pain, GI concerns  : see HPI  Neuro: denies vision changes    OB History    Para Term  AB Living   4 2 2   1 1   SAB IAB Ectopic Molar Multiple Live Births   1         1      # Outcome Date GA Lbr Pedro Pablo/2nd Weight Sex Delivery Anes PTL Lv   4 Current            3 Term 17 38w0d  2.96 kg (6 lb 8.4 oz) F Vag-Spont      2 Term 06 40w1d  3.175 kg (7 lb) F Vag-Spont EPI N THI      Birth Comments: meconium    1 SAB               Obstetric Comments   Pregnancy unplanned but desired. FOB not  involved. Pt does not work outside of home at this time.        GYN: Pt reports hx of herpes, treated with valtrex. She denies recent outbreaks.    Past Medical History:   Diagnosis Date   • Anxiety    • Asthma     as a child   • Borderline personality disorder (HCC)    • Depression    • Heart murmur     at age 5or 6    • Rh negative status during pregnancy 2021       Past Surgical History:   Procedure Laterality Date   • CHOLANGIOGRAMS N/A 2016    Procedure: CHOLANGIOGRAMS;  Surgeon: Doug Grewal M.D.;  Location: SURGERY [facility];  Service:    • ALEXEI BY LAPAROSCOPY N/A 2016    Procedure: ALEXEI BY LAPAROSCOPY;  Surgeon: Doug Grewal M.D.;  Location: SURGERY [facility];  Service:        No current facility-administered medications on file prior to encounter.     Current Outpatient Medications on File Prior to Encounter   Medication Sig Dispense Refill   • ibuprofen (MOTRIN) 600 MG Tab Take 600 mg by mouth one time.     • Prenatal Vit-Fe Sulfate-FA-DHA (PRENATAL VITAMIN/MIN +DHA) 27-0.8-200 MG Cap Take 1 tablet by mouth every day. 30 capsule 10   • valACYclovir (VALTREX) 500 MG Tab Take 1 tablet by mouth every day. 60 tablet 4   • methadone (DOLOPHINE) 5 MG Tab Take 145 mg by mouth every morning before breakfast. Once a day     • nicotine polacrilex (HM NICOTINE POLACRILEX) 4 MG lozenge Place 1 Lozenge into mouth between cheek and gum every 2 hours. 168 Lozenge 0       Family History   Problem Relation Age of Onset   • Heart Disease Mother    • Heart Disease Father    • Diabetes Paternal Grandmother    • Heart Disease Paternal Grandmother        Social History     Tobacco Use   • Smoking status: Former Smoker     Packs/day: 0.25     Years: 18.00     Pack years: 4.50     Types: Cigarettes     Quit date: 2021     Years since quittin.5   • Smokeless tobacco: Never Used   • Tobacco comment: using nicotine lozenges to quit   Vaping Use   • Vaping Use: Never used    Substance Use Topics   • Alcohol use: No   • Drug use: Not Currently     Types: Marijuana, Methamphetamines, Cocaine, IV, Intravenous     Comment: Pt states she has been cleaned since 2015, last used 2021       PE:  Vitals:    21 1237 21 1246 21 1302 21 1316   BP: 148/84 138/68 121/59 140/69   Pulse: 71 63 62 70   Resp:       Temp:       TempSrc:       SpO2:       Weight:       Height:         gen: AAO, NAD, well appearing  HEENT: NCAT, MMM  CV: RRR, extremities well perfused  Resp: CTAB, normal WOB  abd: soft, gravid, NT, [id number]  Ext: NT, no edema  Neuro: non-focal    SVE: 2-3/70/-3 @ ~2pm  FHT: 120/min variability/- accels/ - decels  Isha: occasional ctx    A/P: 36 y.o.  @ 36w2d by 16-week ultrasound presenting for elevated blood pressures in the 160s over 80s.  Patient has past medical history of herpes, for which she takes Valtrex, and has not had a recent outbreak.  Pt has a hx of heroine abuse in the past now on methadone 145mg, uses nicotine gum.    -Admit to obstetrics  -Magnesium protocol  -PCN for GBS prophylaxis  -GBS swab  -Monitor PIH labs  - Continuous FHT/isha      Blayne Powell M.D.

## 2021-11-13 NOTE — NON-PROVIDER
Medical Student Obstetrics and Gynecology H&P    ID: 36y F  @ 36w2d    HPI:   Minnie presents to L&D today after elevated BP was noted in the office yesterday prompting her to come here per OB's recommendation. Her BP was measured at 154/93 yesterday, and was found to be 150s-160s systolic throughout the day today as well with associated headache and intermittent dyspnea x2 weeks she describes as having trouble catching her breath. She denies any vision changes, abdominal pain, N/V/D, extremity swelling, or chest pain.    She reports some mirella douglass contractions weeks ago but denies any recently, she has had moderate amount of white vaginal discharge which is not malodorous, denies any vaginal bleeding or fluid gush, reports consistent fetal movement, denies dysuria or hematuria, and has not had fever/body aches/chills.    History of Current Pregnancy:  Blood type: B negative  GBS: unknown  Rubella: immune  HBsAg: non-reactive  RPR: negative  HIV: negative  Gonorrhea: negative  Chlamydia: negative     Obstetric History:  H/o single SAB at 3m gestation, unknown etiology  Otherwise no complications with other pregnancies  No history of GDM, GHTN, or pre-eclampsia     Gynecologic History:  Age of menses: 12  Cycle length: 28  Menses length: 7d  Has a history of uterine cysts, no history of fibroids, one abnormal pap smear which she does not recall specifics    Genital herpes, currently taking valacyclovir  Chlamydia at age 16, treated  BV 6-7 months ago, treated     Past Medical History:  HCV treated with subsequent negative test    Past Surgical History:  Cholecystectomy in 2017    Allergies:  Aspirin (hives)    Medications:  Methadone 145mg q24h  Valacyclovir  Nicotine Lasange    Family History:  Grandmother with breast cancer  No history of ovarian cancer  Father  of MI at 66y  Mother  of MI at 54y    Social History:  Former IVDA, not since 2021  No smoking during pregnancy, using nicotine  garo    Objective:  Vitals:  Temp 36.4  /75  HR 70  SaO2 94% RA    Physical Exam:  General: anxious, no acute distress.  CVS: RRR, no murmurs, rubs, or extra heart sounds.  Resp: CTA to all lung fields. No crackles, rhonchi, or wheezing.  Abdomen: soft, gravid, non-tender. Vertex presentation.  Extremities: trace pitting edema to the LE bilaterally extending to mid shin. Distal pusles 2+ to extremities x4.  Neuro: A&O x4. No focal neuro deficits.  Psych: no depression. Anxious.  Skin: no rash, pallor, or mottling.     POCUS: fetus in vertex presentation, OP.     Fetal Vitals:  FHT: baseline 130 with moderate variability, accelerations present, no decelerations. Category 1.  Tocometer: no contractions     Labs & Imaging:  Urine protein: 42  Urine creatinine: 224.91  0.2g/day UPEE  AST 25   ALT 25  alk phos 169  BUN 16   Creat 0.64      GBS unknown  Blood type: B negative  GBS: unknown  Rubella: immune  HBsAg: non-reactive  RPR: negative  HIV: negative  Gonorrhea: negative  Chlamydia: negative    Assessment & Plan:  36y F  @ 36w2d here with elevated BP, headache, concerning for pre-clampsia    Plan:  - Admit to L&D for induction of labor  - GBS swab  - Start Penicillin IV 5 million units IV, then 2.5 million units IV q4h until either GBS negative or delivery  - Start MgSO4 titrated to Mg of 4.8-8.4  - Frequent DTRs on exam monitoring for Mg toxicity  - Order pre-eclampsia labs including CBC, CMP, Coag panel, uric acid, and UA with Urine protein creatinine ratio  - Labetolol IV prn SBP >160 and/or DBP >110, monitor for maternal and fetal bradycardia

## 2021-11-14 PROBLEM — O14.13 SEVERE PREECLAMPSIA, THIRD TRIMESTER: Status: ACTIVE | Noted: 2021-11-14

## 2021-11-14 LAB
ERYTHROCYTE [DISTWIDTH] IN BLOOD BY AUTOMATED COUNT: 45.4 FL (ref 35.9–50)
GP B STREP DNA SPEC QL NAA+PROBE: NEGATIVE
HCT VFR BLD AUTO: 31.5 % (ref 37–47)
HGB BLD-MCNC: 10.7 G/DL (ref 12–16)
IMMUNE ROSETTING TEST 8505FMH: NORMAL
MAGNESIUM SERPL-MCNC: 5.5 MG/DL (ref 1.5–2.5)
MAGNESIUM SERPL-MCNC: 5.6 MG/DL (ref 1.5–2.5)
MCH RBC QN AUTO: 32.5 PG (ref 27–33)
MCHC RBC AUTO-ENTMCNC: 34 G/DL (ref 33.6–35)
MCV RBC AUTO: 95.7 FL (ref 81.4–97.8)
NUMBER OF RH DOSES IND 8505RD: 1
PLATELET # BLD AUTO: 123 K/UL (ref 164–446)
PMV BLD AUTO: 12.3 FL (ref 9–12.9)
RBC # BLD AUTO: 3.29 M/UL (ref 4.2–5.4)
WBC # BLD AUTO: 10.4 K/UL (ref 4.8–10.8)

## 2021-11-14 PROCEDURE — 700105 HCHG RX REV CODE 258: Performed by: OBSTETRICS & GYNECOLOGY

## 2021-11-14 PROCEDURE — 85461 HEMOGLOBIN FETAL: CPT

## 2021-11-14 PROCEDURE — 88307 TISSUE EXAM BY PATHOLOGIST: CPT

## 2021-11-14 PROCEDURE — 700111 HCHG RX REV CODE 636 W/ 250 OVERRIDE (IP): Performed by: NURSE PRACTITIONER

## 2021-11-14 PROCEDURE — A9270 NON-COVERED ITEM OR SERVICE: HCPCS

## 2021-11-14 PROCEDURE — 700102 HCHG RX REV CODE 250 W/ 637 OVERRIDE(OP): Performed by: NURSE PRACTITIONER

## 2021-11-14 PROCEDURE — 85027 COMPLETE CBC AUTOMATED: CPT

## 2021-11-14 PROCEDURE — A9270 NON-COVERED ITEM OR SERVICE: HCPCS | Performed by: NURSE PRACTITIONER

## 2021-11-14 PROCEDURE — 700102 HCHG RX REV CODE 250 W/ 637 OVERRIDE(OP)

## 2021-11-14 PROCEDURE — 36415 COLL VENOUS BLD VENIPUNCTURE: CPT

## 2021-11-14 PROCEDURE — 59410 OBSTETRICAL CARE: CPT | Performed by: NURSE PRACTITIONER

## 2021-11-14 PROCEDURE — 700111 HCHG RX REV CODE 636 W/ 250 OVERRIDE (IP)

## 2021-11-14 PROCEDURE — A9270 NON-COVERED ITEM OR SERVICE: HCPCS | Performed by: OBSTETRICS & GYNECOLOGY

## 2021-11-14 PROCEDURE — 83735 ASSAY OF MAGNESIUM: CPT

## 2021-11-14 PROCEDURE — 59409 OBSTETRICAL CARE: CPT

## 2021-11-14 PROCEDURE — 304965 HCHG RECOVERY SERVICES

## 2021-11-14 PROCEDURE — 770002 HCHG ROOM/CARE - OB PRIVATE (112)

## 2021-11-14 PROCEDURE — 700105 HCHG RX REV CODE 258

## 2021-11-14 PROCEDURE — 700102 HCHG RX REV CODE 250 W/ 637 OVERRIDE(OP): Performed by: OBSTETRICS & GYNECOLOGY

## 2021-11-14 RX ORDER — ONDANSETRON 2 MG/ML
4 INJECTION INTRAMUSCULAR; INTRAVENOUS EVERY 6 HOURS PRN
Status: DISCONTINUED | OUTPATIENT
Start: 2021-11-14 | End: 2021-11-16 | Stop reason: HOSPADM

## 2021-11-14 RX ORDER — VITAMIN A ACETATE, BETA CAROTENE, ASCORBIC ACID, CHOLECALCIFEROL, .ALPHA.-TOCOPHEROL ACETATE, DL-, THIAMINE MONONITRATE, RIBOFLAVIN, NIACINAMIDE, PYRIDOXINE HYDROCHLORIDE, FOLIC ACID, CYANOCOBALAMIN, CALCIUM CARBONATE, FERROUS FUMARATE, ZINC OXIDE, CUPRIC OXIDE 3080; 12; 120; 400; 1; 1.84; 3; 20; 22; 920; 25; 200; 27; 10; 2 [IU]/1; UG/1; MG/1; [IU]/1; MG/1; MG/1; MG/1; MG/1; MG/1; [IU]/1; MG/1; MG/1; MG/1; MG/1; MG/1
1 TABLET, FILM COATED ORAL
Status: DISCONTINUED | OUTPATIENT
Start: 2021-11-14 | End: 2021-11-16 | Stop reason: HOSPADM

## 2021-11-14 RX ORDER — MAGNESIUM SULFATE HEPTAHYDRATE 40 MG/ML
2 INJECTION, SOLUTION INTRAVENOUS CONTINUOUS
Status: DISPENSED | OUTPATIENT
Start: 2021-11-14 | End: 2021-11-15

## 2021-11-14 RX ORDER — DOCUSATE SODIUM 100 MG/1
100 CAPSULE, LIQUID FILLED ORAL 2 TIMES DAILY PRN
Status: DISCONTINUED | OUTPATIENT
Start: 2021-11-14 | End: 2021-11-16 | Stop reason: HOSPADM

## 2021-11-14 RX ORDER — ACETAMINOPHEN 500 MG
1000 TABLET ORAL EVERY 6 HOURS PRN
Status: DISCONTINUED | OUTPATIENT
Start: 2021-11-14 | End: 2021-11-16 | Stop reason: HOSPADM

## 2021-11-14 RX ORDER — VALACYCLOVIR HYDROCHLORIDE 500 MG/1
500 TABLET, FILM COATED ORAL DAILY
Status: COMPLETED | OUTPATIENT
Start: 2021-11-14 | End: 2021-11-16

## 2021-11-14 RX ORDER — IBUPROFEN 800 MG/1
800 TABLET ORAL EVERY 6 HOURS PRN
Status: DISCONTINUED | OUTPATIENT
Start: 2021-11-14 | End: 2021-11-16 | Stop reason: HOSPADM

## 2021-11-14 RX ORDER — ACETAMINOPHEN 325 MG/1
650 TABLET ORAL EVERY 4 HOURS PRN
Status: DISCONTINUED | OUTPATIENT
Start: 2021-11-14 | End: 2021-11-14

## 2021-11-14 RX ORDER — VALACYCLOVIR HYDROCHLORIDE 500 MG/1
500 TABLET, FILM COATED ORAL 2 TIMES DAILY
Status: DISCONTINUED | OUTPATIENT
Start: 2021-11-14 | End: 2021-11-14

## 2021-11-14 RX ORDER — SODIUM CHLORIDE, SODIUM LACTATE, POTASSIUM CHLORIDE, CALCIUM CHLORIDE 600; 310; 30; 20 MG/100ML; MG/100ML; MG/100ML; MG/100ML
INJECTION, SOLUTION INTRAVENOUS PRN
Status: DISCONTINUED | OUTPATIENT
Start: 2021-11-14 | End: 2021-11-16 | Stop reason: HOSPADM

## 2021-11-14 RX ORDER — BISACODYL 10 MG
10 SUPPOSITORY, RECTAL RECTAL PRN
Status: DISCONTINUED | OUTPATIENT
Start: 2021-11-14 | End: 2021-11-16 | Stop reason: HOSPADM

## 2021-11-14 RX ADMIN — ACETAMINOPHEN 650 MG: 325 TABLET, FILM COATED ORAL at 03:06

## 2021-11-14 RX ADMIN — ACETAMINOPHEN 650 MG: 325 TABLET ORAL at 05:53

## 2021-11-14 RX ADMIN — MAGNESIUM SULFATE HEPTAHYDRATE 2 G/HR: 40 INJECTION, SOLUTION INTRAVENOUS at 11:23

## 2021-11-14 RX ADMIN — METHADONE HYDROCHLORIDE 20 MG: 40 TABLET ORAL at 06:02

## 2021-11-14 RX ADMIN — ACETAMINOPHEN 1000 MG: 500 TABLET ORAL at 12:15

## 2021-11-14 RX ADMIN — ACETAMINOPHEN 1000 MG: 500 TABLET ORAL at 20:20

## 2021-11-14 RX ADMIN — METHADONE HYDROCHLORIDE 5 MG: 5 TABLET ORAL at 06:03

## 2021-11-14 RX ADMIN — IBUPROFEN 800 MG: 800 TABLET, FILM COATED ORAL at 17:43

## 2021-11-14 RX ADMIN — SODIUM CHLORIDE, POTASSIUM CHLORIDE, SODIUM LACTATE AND CALCIUM CHLORIDE: 600; 310; 30; 20 INJECTION, SOLUTION INTRAVENOUS at 20:00

## 2021-11-14 RX ADMIN — SODIUM CHLORIDE 2.5 MILLION UNITS: 9 INJECTION, SOLUTION INTRAVENOUS at 02:55

## 2021-11-14 RX ADMIN — DOCUSATE SODIUM 100 MG: 100 CAPSULE ORAL at 20:20

## 2021-11-14 RX ADMIN — VALACYCLOVIR HYDROCHLORIDE 500 MG: 500 TABLET, FILM COATED ORAL at 05:54

## 2021-11-14 RX ADMIN — METHADONE HYDROCHLORIDE 120 MG: 40 TABLET ORAL at 06:02

## 2021-11-14 ASSESSMENT — PAIN DESCRIPTION - PAIN TYPE
TYPE: ACUTE PAIN

## 2021-11-14 ASSESSMENT — EDINBURGH POSTNATAL DEPRESSION SCALE (EPDS)
I HAVE LOOKED FORWARD WITH ENJOYMENT TO THINGS: AS MUCH AS I EVER DID
I HAVE BEEN ANXIOUS OR WORRIED FOR NO GOOD REASON: HARDLY EVER
I HAVE FELT SAD OR MISERABLE: NO, NOT AT ALL
THINGS HAVE BEEN GETTING ON TOP OF ME: NO, I HAVE BEEN COPING AS WELL AS EVER
THE THOUGHT OF HARMING MYSELF HAS OCCURRED TO ME: NEVER
I HAVE BEEN ABLE TO LAUGH AND SEE THE FUNNY SIDE OF THINGS: AS MUCH AS I ALWAYS COULD
I HAVE FELT SCARED OR PANICKY FOR NO GOOD REASON: NO, NOT AT ALL
I HAVE BEEN SO UNHAPPY THAT I HAVE HAD DIFFICULTY SLEEPING: NOT AT ALL
I HAVE BEEN SO UNHAPPY THAT I HAVE BEEN CRYING: NO, NEVER
I HAVE BLAMED MYSELF UNNECESSARILY WHEN THINGS WENT WRONG: NO, NEVER

## 2021-11-14 ASSESSMENT — PAIN SCALES - GENERAL: PAIN_LEVEL: 0

## 2021-11-14 NOTE — ANESTHESIA TIME REPORT
Anesthesia Start and Stop Event Times     Date Time Event    11/13/2021 2138 Ready for Procedure     2150 Anesthesia Start    11/14/2021 0410 Anesthesia Stop        Responsible Staff  11/13/21 to 11/14/21    Name Role Begin End    Socrates Short M.D. Anesth 2150 0410        Preop Diagnosis (Free Text):  Pre-op Diagnosis             Preop Diagnosis (Codes):    Premium Reason  E. Weekend    Comments:

## 2021-11-14 NOTE — L&D DELIVERY NOTE
OB Vaginal Delivery Note    2021  Minnie Sherman  36 y.o.    Patient was admitted to Labor and Delivery at 36w3d for induction of labor due to pre-eclampsia    Review the Delivery Report for details.     Gestational Age: 36w3d  /Para:   Labor Complications:  none  Blood Loss: 350ml  Delivery Blood Loss  21 1610 - 21 0428    Est. Blood Loss Hospital Encounter 350 mL    Total  350 mL        Delivery Type: Vaginal, Spontaneous   ROM to Delivery Time: 5h 12m  Byron Sex: Male   Weight:  pending   1 Minute 5 Minute 10 Minute   Apgar Totals:   8   9        Delivery Details:  corina Galenaa 36 y.o.  female delivered a viable Male infant, Partha, at 0410 with Apgars of 8   and 9  . Patient progressed to complete with the use of pitocin.   The patient was put in the dorsal lithotomy position. Delivery was via Vaginal, Spontaneous  to a sterile field under Epidural  anesthesia. Infant delivered in Vertex  Right  Occiput  Anterior  position. Anterior and posterior shoulders delivered without difficulty. The infant was placed on the maternal abdomen and dried and stimulated by the RN.     The cord was double clamped after 1 minute, cut  and 3 Vessels  were noted. No cord complications. 20 units pitocin in 1000ml LR was initiated rapid IV.  Cord blood not was obtained   . Abnormal  placenta due to succenturiate  lobe delivered at 0413. Placental disposition: pathology . Fundal massage performed and fundus found to be firm. Perineum, vagina, cervix were inspected, and no lacerations were noted.    Infant and patient in delivery room in good and stable condition.   MONSE Pena Dr, MD

## 2021-11-14 NOTE — ED PROVIDER NOTES
Emergency Obstetric Consultation     Date of Service  2021    Reason for Consultation  Chief Complaint   Patient presents with   • Other     elevated bp     History of Presenting Illness  36 y.o. female who presented 2021 with headache and elevated blood pressures.    Review of Systems  Denies RUQ pain, nausea or vomiting  Admits to good fetal movement, no vaginal bleeding.  No contractions    Obstetric History    OB History    Para Term  AB Living   4 2 2   1 2   SAB IAB Ectopic Molar Multiple Live Births   1         2      # Outcome Date GA Lbr Pedro Pablo/2nd Weight Sex Delivery Anes PTL Lv   4 Current            3 Term 17 38w0d  2.96 kg (6 lb 8.4 oz) F Vag-Spont   THI   2 Term 06 40w1d  3.175 kg (7 lb) F Vag-Spont EPI N THI      Birth Comments: meconium    1 SAB               Obstetric Comments   Pregnancy unplanned but desired. FOB not involved. Pt does not work outside of home at this time.      Gynecologic History  No LMP recorded (lmp unknown). Patient is pregnant.    Medical History  Past Medical History:   Diagnosis Date   • Anxiety    • Asthma     as a child   • Borderline personality disorder (HCC)    • Depression    • Heart murmur     at age 5or 6    • Rh negative status during pregnancy 2021       Surgical History   has a past surgical history that includes cholangiograms (N/A, 2016) and richard by laparoscopy (N/A, 2016).    Family History  family history includes Diabetes in her paternal grandmother; Heart Disease in her father, mother, and paternal grandmother.    Social History   reports that she quit smoking about 7 months ago. Her smoking use included cigarettes. She has a 4.50 pack-year smoking history. She has never used smokeless tobacco. She reports previous drug use. Drugs: Marijuana, Methamphetamines, Cocaine, IV, and Intravenous. She reports that she does not drink alcohol.    Medications  Medications Prior to Admission   Medication Sig  Dispense Refill Last Dose   • ibuprofen (MOTRIN) 600 MG Tab Take 600 mg by mouth one time.   2021 at Unknown time   • Prenatal Vit-Fe Sulfate-FA-DHA (PRENATAL VITAMIN/MIN +DHA) 27-0.8-200 MG Cap Take 1 tablet by mouth every day. 30 capsule 10 2021 at Unknown time   • valACYclovir (VALTREX) 500 MG Tab Take 1 tablet by mouth every day. 60 tablet 4 2021 at Unknown time   • methadone (DOLOPHINE) 5 MG Tab Take 145 mg by mouth every morning before breakfast. Once a day   2021 at Unknown time   • nicotine polacrilex (HM NICOTINE POLACRILEX) 4 MG lozenge Place 1 Lozenge into mouth between cheek and gum every 2 hours. 168 Lozenge 0 2021 at Unknown time       Allergies  Allergies   Allergen Reactions   • Aspirin Hives     Reaction is to high dose aspiring,  Tolerated Toradol 16, patient states can tolerate Ibuprofen       Physical Exam  Maternal Exam:  Abdomen: Patient reports no abdominal tenderness. Pelvis: adequate for delivery.            Laboratory  Recent Labs     21  1235   WBC 6.7   RBC 3.39*   HEMOGLOBIN 10.8*   HEMATOCRIT 31.8*   MCV 93.8   MCH 31.9   MCHC 34.0   RDW 44.0   PLATELETCT 119*   MPV 12.0     Recent Labs     21  1235   SODIUM 138   POTASSIUM 4.3   CHLORIDE 106   CO2 21   GLUCOSE 105*   BUN 16   CREATININE 0.64   CALCIUM 8.7            Assessment & Plan  36 y.o.  at 36w2d here for PEC with SF  Admit to L&D        Fito Brown D.O.

## 2021-11-14 NOTE — PROGRESS NOTES
Maxime from Lab called with critical result of Magnesium at 5.6. Critical lab result read back to Maxime.   This critical lab result is within parameters established by RENE Pena for this patient

## 2021-11-14 NOTE — PROGRESS NOTES
1545- Received report from MARCO Esteban.   1615- Dr. Brown performed bright light exam, no visual lesions and pt denies any sensation of pending HSV outbreak.  Dr. Brown ordered US for placenta placement, due to low lying placenta on last US.  Dr. Brown to order valtrex and methadone for am administration.  1800- US results available, low lying placenta resolved.  IV ABX and Pitocin started, per Dr. Brown for IOL-pre eclampsia.  1900- Report given to MARCO Chau.

## 2021-11-14 NOTE — CARE PLAN
Problem: Risk for Injury  Goal: Patient and fetus will be free of preventable injury/complications  Outcome: Met  Note:  of a viable male infant. APGARS 8/9.     Problem: Pain  Goal: Patient's pain will be alleviated or reduced to the patient’s comfort goal  Outcome: Met  Note: Pt desires epidural for pain management. Epidural placed. Pt tolerated well. Pt reports adequate pain relief.

## 2021-11-14 NOTE — PROGRESS NOTES
1900 Report received from MARCO Galarza. Assuming care. Pt resting comfortably in bed, no needs at this time.      Pt desires epidural for pain management. Call to Dr. Short, anesthesia, to clarify bolus amount. Orders received for 1L bolus of LR.    1 Epidural placed. Pt tolerated well. Pt reports adequate pain relief.     0410  of a viable male infant, APGARS 8/9.    0515 Report given to MARCO Hernandez.

## 2021-11-14 NOTE — ANESTHESIA POSTPROCEDURE EVALUATION
Patient: Minnie Sherman    Procedure Summary     Date: 11/13/21 Room / Location:     Anesthesia Start: 2150 Anesthesia Stop: 11/14/21 0410    Procedure: Labor Epidural Diagnosis:     Scheduled Providers:  Responsible Provider: Socrates Short M.D.    Anesthesia Type: epidural ASA Status: 2          Final Anesthesia Type: epidural  Last vitals  BP   Blood Pressure: 158/92    Temp   36.8 °C (98.2 °F)    Pulse   82   Resp   18    SpO2   95 %      Anesthesia Post Evaluation    Patient location during evaluation: floor  Patient participation: complete - patient participated  Level of consciousness: awake and alert  Pain score: 0    Airway patency: patent  Anesthetic complications: no  Cardiovascular status: hemodynamically stable  Respiratory status: acceptable  Hydration status: euvolemic    PONV: none          No complications documented.     Nurse Pain Score: 3 (NPRS)

## 2021-11-14 NOTE — ANESTHESIA PREPROCEDURE EVALUATION
Date: 11/13/21  Procedure: Labor Epidural         Relevant Problems   NEURO   (positive) History of drug use   (positive) History of seizures >10 years ago       Physical Exam    Airway   Mallampati: II  TM distance: >3 FB  Neck ROM: full       Cardiovascular - normal exam  Rhythm: regular  Rate: normal  (-) murmur     Dental - normal exam           Pulmonary - normal exam  Breath sounds clear to auscultation     Abdominal    Neurological - normal exam                 Anesthesia Plan    ASA 2       Plan - epidural   Neuraxial block will be labor analgesia                  Pertinent diagnostic labs and testing reviewed    Informed Consent:    Anesthetic plan and risks discussed with patient.

## 2021-11-14 NOTE — DISCHARGE PLANNING
"Care Transition Team Discharge Planning    Anticipated Discharge Disposition: TBD    Action: LSw attempted to meet w/ MOB to complete d/c planning assessment. The sign on the door says do not disturb.    LSw spoke to RN who checked to see pt is still asleep, and asked LSw to return later.    RN reports, MOB advised RN the MOB, \"already has a .\"     Barriers to Discharge: TBD    Plan: HCM will follow, and assist w/ d/c planning.    "

## 2021-11-14 NOTE — DISCHARGE PLANNING
Care Transition Team Discharge Planning    Anticipated Discharge Disposition: TBD    Action: LSw received IPSS order indicating methadone use, and hx of depression/anxiety.    The baby has been tested, UDS. MOB's UDS is positive for methadone.    Unable to contact MOB bedside RN, but sent texts w/ baby's RN. NBN RN indicates baby's UDS is pending.    Barriers to Discharge: TBD    Plan: HCM will continue to follow, and assist w/ d/c planning.

## 2021-11-14 NOTE — ANESTHESIA PROCEDURE NOTES
Epidural Block    Date/Time: 11/13/2021 9:51 PM  Performed by: Socrates Short M.D.  Authorized by: Socrates Short M.D.     Patient Location:  OB  Start Time:  11/13/2021 9:51 PM  End Time:  11/13/2021 10:01 PM  Reason for Block: labor analgesia    patient identified, IV checked, site marked, risks and benefits discussed, surgical consent, monitors and equipment checked, pre-op evaluation and timeout performed    Patient Position:  Sitting  Prep: ChloraPrep, patient draped and sterile technique    Monitoring:  Blood pressure, continuous pulse oximetry and heart rate  Approach:  Midline  Location:  L3-L4  Injection Technique:  KATIE air  Skin infiltration:  Lidocaine  Strength:  1%  Dose:  3ml  Needle Type:  Tuohy  Needle Gauge:  17 G  Needle Length:  3.5 in  Loss of resistance::  7  Catheter Size:  19 G  Catheter at Skin Depth:  20  Test Dose Result:  Negative

## 2021-11-14 NOTE — PROGRESS NOTES
Pt arrived via wheelchair with belongings to room S312. Report received from FLORIDA Yee&CHANDA RN. Patient assessment complete and WDL. Fundus firm and lochia light, VSS. IV infusing 75mL of pitocin and 25mL fo Magnesium Sulfate in R hand. Patient denies pain at this time, flowsheet. Patient ambulating to bathroom and voiding without difficulty. Patient denies any dizziness/lightheadedness or calf pain/tenderness. Patient also denies any chest pain, difficulty breathing or SOB, respiratory symptoms, or any recent ill contacts. Patient oriented to unit, routine postpartum cares, room, call light, emergency light, infant safety and security. Kindly reminded patient and support person to wear face masks when staff is present in room; also visitors need to wear mask when in room. Visiting hours discussed with patient and SO. Plan of care discussed with patient for the day including infant feeding every 3-4 hours or on demand, pain management, and ambulation in halls. Pain medication plan discussed with patient; patient states she will call if PRN pain medication is wanted. All questions/concerns addressed at this time. Call light within reach and patient encouraged to call with needs. Will continue with routine postpartum cares.

## 2021-11-15 ENCOUNTER — TELEPHONE (OUTPATIENT)
Dept: OBGYN | Facility: CLINIC | Age: 36
End: 2021-11-15

## 2021-11-15 LAB
ACTION RH IMMUNE GLOB 8505RHG: NORMAL
PATHOLOGY CONSULT NOTE: NORMAL

## 2021-11-15 PROCEDURE — A9270 NON-COVERED ITEM OR SERVICE: HCPCS

## 2021-11-15 PROCEDURE — 700102 HCHG RX REV CODE 250 W/ 637 OVERRIDE(OP): Performed by: OBSTETRICS & GYNECOLOGY

## 2021-11-15 PROCEDURE — 770002 HCHG ROOM/CARE - OB PRIVATE (112)

## 2021-11-15 PROCEDURE — 700102 HCHG RX REV CODE 250 W/ 637 OVERRIDE(OP)

## 2021-11-15 PROCEDURE — A9270 NON-COVERED ITEM OR SERVICE: HCPCS | Performed by: NURSE PRACTITIONER

## 2021-11-15 PROCEDURE — 3E0334Z INTRODUCTION OF SERUM, TOXOID AND VACCINE INTO PERIPHERAL VEIN, PERCUTANEOUS APPROACH: ICD-10-PCS | Performed by: OBSTETRICS & GYNECOLOGY

## 2021-11-15 PROCEDURE — A9270 NON-COVERED ITEM OR SERVICE: HCPCS | Performed by: OBSTETRICS & GYNECOLOGY

## 2021-11-15 PROCEDURE — 700102 HCHG RX REV CODE 250 W/ 637 OVERRIDE(OP): Performed by: NURSE PRACTITIONER

## 2021-11-15 RX ADMIN — METHADONE HYDROCHLORIDE 5 MG: 5 TABLET ORAL at 05:58

## 2021-11-15 RX ADMIN — METHADONE HYDROCHLORIDE 120 MG: 40 TABLET ORAL at 05:57

## 2021-11-15 RX ADMIN — IBUPROFEN 800 MG: 800 TABLET, FILM COATED ORAL at 23:51

## 2021-11-15 RX ADMIN — METHADONE HYDROCHLORIDE 20 MG: 40 TABLET ORAL at 05:58

## 2021-11-15 RX ADMIN — NICOTINE 14 MG: 14 PATCH TRANSDERMAL at 08:46

## 2021-11-15 RX ADMIN — DOCUSATE SODIUM 100 MG: 100 CAPSULE ORAL at 08:47

## 2021-11-15 RX ADMIN — ACETAMINOPHEN 1000 MG: 500 TABLET ORAL at 05:36

## 2021-11-15 RX ADMIN — PRENATAL WITH FERROUS FUM AND FOLIC ACID 1 TABLET: 3080; 920; 120; 400; 22; 1.84; 3; 20; 10; 1; 12; 200; 27; 25; 2 TABLET ORAL at 08:47

## 2021-11-15 RX ADMIN — VALACYCLOVIR HYDROCHLORIDE 500 MG: 500 TABLET, FILM COATED ORAL at 05:58

## 2021-11-15 RX ADMIN — IBUPROFEN 800 MG: 800 TABLET, FILM COATED ORAL at 05:36

## 2021-11-15 ASSESSMENT — PAIN DESCRIPTION - PAIN TYPE
TYPE: ACUTE PAIN

## 2021-11-15 NOTE — CARE PLAN
The patient is Stable - Low risk of patient condition declining or worsening    Shift Goals  Clinical Goals: BP <160/110, lochia light, pain management    Progress made toward(s) clinical / shift goals:  BP have remained <160/110, lochia remains light, and pain controlled with PRN pain medication    Patient is not progressing towards the following goals:

## 2021-11-15 NOTE — PROGRESS NOTES
1930 Assessment completed. Lochia light, fundus firm. Plan of care reviewed. Declines pain at this time but does report a head ache, See MAR for medical intervention.   0315 RN Shellie found that patient had accidentally removed IV access while trying to feed infant. Mag due to end by 0400, new IV access not attempted for now. Patient reports having a headache and asked for Motrin or Tylenol. When this RN returned with medication, patient was already asleep.

## 2021-11-15 NOTE — TELEPHONE ENCOUNTER
Phone Number Called: [phone]     Call outcome: Spoke to patient regarding message below.    Message: I spoke to patient regarding recent lab results. I notified her that they came back within the normal limits, Pt understood and had no further questions at the time.

## 2021-11-15 NOTE — DISCHARGE PLANNING
Discharge Planning Assessment Post Partum    [facility] Referral: [facility], history of methadone, anxiety, and depression  Address: [address] [place] [id number]  Phone: [phone]  Type of Living Situation: living with FOB and 4 year old daughter  Mom Diagnosis: Pregnancy  Baby Diagnosis: -36.3 weeks  Primary Language: English    Name of Baby: Partha Shahid (: 21)  Father of the Baby: Fili Shahid (: 88)  Involved in baby’s care? Yes  Contact Information: [phone]    Prenatal Care: Yes-[facility]  Mom's PCP: None  PCP for new baby: Pediatrician list provided    Support System: B  Coping/Bonding between mother & baby: Yes  Source of Feeding: breat feeding   Supplies for Infant: prepared for infant; denies any needs    Mom's Insurance: Winter Medicaid  Baby Covered on Insurance:Yes  Mother Employed/School: Not currently  Other children in the home/names & ages: 15 year old daughter that is living with her Paternal Grandparents in [place] and a 4 year old daughter-Kashif Shahid (17) that is living MOB and FOB    Financial Hardship/Income: No, FOB is employed with [employer]   Mom's Mental status: alert and oriented  Services used prior to admit: Medicaid, WIC, and food stamps    CPS History: Yes, open case with Trisha Masters ([phone]).  Spoke with Trisha and provided an update.  Trisha asked that a report is called in to [facility] ([phone]).  RENATO called in a new report and spoke with Bruno Koch.  Psychiatric History: history of anxiety and depression.  LAYO scored a 1 on the EPDS screen.  LAYO stated she has a counselor at [facility] named Lupe Fournier that she sees weekly  Domestic Violence History: No  Drug/ETOH History: history of drug use.  LAYO states she has been clean since 2021.  CPS opened a case on her and the FOB and removed Tarren.  Tarren is now back with parents and the case will be closed in the next month or two per Trisha Masters.  LAYO  tested positive for Phencyclidine (PCP) and methadone.  Infant tested positive for methadone.  MOB admits to taking 145 mg of methadone from the [facility].  MOB stated she and the FOB were both sick and she was taking cold medicine that has dextromethorphan which can cause a false positive for PCP.  MOB denies PCP use.    Resources Provided: pediatrician list, children and family resource list, and post partum support and counseling resources provided to mother  Referrals Made: diaper bank referral provided and a report was called in to [facility]     Clearance for Discharge: Infant is cleared to discharge home with parents     Ongoing Plan: Infant will monitored for five days for withdrawals, plan is for baby to discharge on Friday, 11/19

## 2021-11-15 NOTE — CARE PLAN
The patient is Stable - Low risk of patient condition declining or worsening    Shift Goals  Clinical Goals: Patient will maintain BP <160/110; Pain WDL; Lochia WDL    Progress made toward(s) clinical / shift goals:  **  Problem: Risk for Excess Fluid Volume  Goal: Patient will demonstrate pulse, blood pressure and neurologic signs within expected ranges and without any respiratory complications  Outcome: Progressing     Problem: Pain - Standard  Goal: Alleviation of pain or a reduction in pain to the patient’s comfort goal  Outcome: Progressing     Problem: Knowledge Deficit - Standard  Goal: Patient and family/care givers will demonstrate understanding of plan of care, disease process/condition, diagnostic tests and medications  Outcome: Progressing     Problem: Knowledge Deficit - Postpartum  Goal: Patient will verbalize and demonstrate understanding of self and infant care  Outcome: Progressing     Problem: Psychosocial - Postpartum  Goal: Patient will verbalize and demonstrate effective bonding and parenting behavior  Outcome: Progressing     Problem: Altered Physiologic Condition  Goal: Patient physiologically stable as evidenced by normal lochia, palpable uterine involution and vitals within normal limits  Outcome: Progressing     Problem: Infection - Postpartum  Goal: Postpartum patient will be free of signs and symptoms of infection  Outcome: Progressing   *

## 2021-11-15 NOTE — PROGRESS NOTES
Obstetrics & Gynecology Post-Delivery Progress Note    Date of Service      36 y.o.  s/p vaginal, spontaneous  Delivery date: 2021    Events  No events; continuing methadone    Subjective  Pain: No  Bleeding: lochia minimal  PO's: taking regular diet  Voiding: without difficulty  Ambulating: yes  Passing flatus: No  Feeding: bottlefeeding    Objective  Temp:  [36.3 °C (97.3 °F)-37.4 °C (99.4 °F)] 36.8 °C (98.2 °F)  Pulse:  [0-98] 78  Resp:  [16-24] 18  BP: (106-161)/(61-98) 161/91  SpO2:  [93 %-99 %] 99 %    Physical Exam  General: well and resting  Chest/Breasts: nipples intact   Abdomen: nontender, normal bowel sounds, non-distended  Fundus: firm and at umbilicus  Incision: not applicable, (vaginal delivery)  Perineum: well approximated  Extremities: 1+ edema, calves nontender    Recent Labs     21  1235 21  1352   WBC 6.7 10.4   RBC 3.39* 3.29*   HEMOGLOBIN 10.8* 10.7*   HEMATOCRIT 31.8* 31.5*   MCV 93.8 95.7   MCH 31.9 32.5   RDW 44.0 45.4   PLATELETCT 119* 123*   MPV 12.0 12.3       Assessment/Plan  Minnie Sherman is a 36 y.o.yo  s/p postpartum day #3  s/p vaginal, spontaneous    1. Post care: meeting all goals  2. Hemodynamics: stable  3. Pain: controlled  4. Rh+, Rubella Immune  5. Method of Feeding: plans to breastfeed, plans to bottle feed  6. Method of Contraception: tubal ligation  7. Disposition: likely home postpartum day 2    VTE prophylaxis: none indicated

## 2021-11-16 ENCOUNTER — PHARMACY VISIT (OUTPATIENT)
Dept: PHARMACY | Facility: MEDICAL CENTER | Age: 36
End: 2021-11-16
Payer: COMMERCIAL

## 2021-11-16 VITALS
HEART RATE: 65 BPM | DIASTOLIC BLOOD PRESSURE: 55 MMHG | TEMPERATURE: 97.8 F | OXYGEN SATURATION: 100 % | BODY MASS INDEX: 31.08 KG/M2 | HEIGHT: 67 IN | SYSTOLIC BLOOD PRESSURE: 132 MMHG | RESPIRATION RATE: 19 BRPM | WEIGHT: 198 LBS

## 2021-11-16 PROCEDURE — A9270 NON-COVERED ITEM OR SERVICE: HCPCS | Performed by: NURSE PRACTITIONER

## 2021-11-16 PROCEDURE — 700102 HCHG RX REV CODE 250 W/ 637 OVERRIDE(OP): Performed by: NURSE PRACTITIONER

## 2021-11-16 PROCEDURE — 700102 HCHG RX REV CODE 250 W/ 637 OVERRIDE(OP): Performed by: OBSTETRICS & GYNECOLOGY

## 2021-11-16 PROCEDURE — A9270 NON-COVERED ITEM OR SERVICE: HCPCS | Performed by: OBSTETRICS & GYNECOLOGY

## 2021-11-16 PROCEDURE — RXMED WILLOW AMBULATORY MEDICATION CHARGE: Performed by: STUDENT IN AN ORGANIZED HEALTH CARE EDUCATION/TRAINING PROGRAM

## 2021-11-16 RX ORDER — ACETAMINOPHEN 500 MG
1000 TABLET ORAL EVERY 8 HOURS PRN
Qty: 60 TABLET | Refills: 0 | Status: SHIPPED | OUTPATIENT
Start: 2021-11-16

## 2021-11-16 RX ORDER — PSEUDOEPHEDRINE HCL 30 MG
100 TABLET ORAL 2 TIMES DAILY PRN
Qty: 60 CAPSULE | Refills: 0 | Status: SHIPPED | OUTPATIENT
Start: 2021-11-16

## 2021-11-16 RX ORDER — IBUPROFEN 800 MG/1
800 TABLET ORAL EVERY 8 HOURS PRN
Qty: 60 TABLET | Refills: 0 | Status: SHIPPED | OUTPATIENT
Start: 2021-11-16

## 2021-11-16 RX ADMIN — METHADONE HYDROCHLORIDE 5 MG: 5 TABLET ORAL at 06:32

## 2021-11-16 RX ADMIN — METHADONE HYDROCHLORIDE 120 MG: 40 TABLET ORAL at 06:31

## 2021-11-16 RX ADMIN — NICOTINE 14 MG: 14 PATCH TRANSDERMAL at 06:30

## 2021-11-16 RX ADMIN — PRENATAL WITH FERROUS FUM AND FOLIC ACID 1 TABLET: 3080; 920; 120; 400; 22; 1.84; 3; 20; 10; 1; 12; 200; 27; 25; 2 TABLET ORAL at 08:18

## 2021-11-16 RX ADMIN — VALACYCLOVIR HYDROCHLORIDE 500 MG: 500 TABLET, FILM COATED ORAL at 06:32

## 2021-11-16 RX ADMIN — METHADONE HYDROCHLORIDE 20 MG: 40 TABLET ORAL at 06:31

## 2021-11-16 NOTE — DISCHARGE INSTRUCTIONS
PATIENT DISCHARGE EDUCATION INSTRUCTION SHEET  REASONS TO CALL YOUR OBSTETRICIAN  · Persistent fever, shaking, chills (Temperature higher than 100.4) may indicate you have an infection  · Heavy bleeding: soaking more than 1 pad per hour; Passing clots an egg-sized clot or bigger may mean you have an postpartum hemorrhage  · Foul odor from vagina or bad smelling or discolored discharge or blood  · Breast infection (Mastitis symptoms); breast pain, chills, fever, redness or red streaks, may feel flu like symptoms  · Urinary pain, burning or frequency  · Incision that is not healing, increased redness, swelling, tenderness or pain, or any pus from episiotomy or  site may mean you have an infection  · Redness, swelling, warmth, or painful to touch in the calf area of your leg may mean you have a blood clot  · Severe or intensified depression, thoughts or feelings of wanting to hurt yourself or someone else   · Pain in chest, obstructed breathing or shortness of breath (trouble catching your breath) may mean you are having a postpartum complication. Call your provider immediately   · Headache that does not get better, even after taking medicine, a bad headache with vision changes or pain in the upper right area of your belly may mean you have high blood pressure or post birth preeclampsia. Call your provider immediately    HAND WASHING  All family and friends should wash their hands:  · Before and after holding the baby  · Before feeding the baby  · After using the restroom or changing the baby's diaper    WOUND CARE  Ask your physician for additional care instructions. In general:  Do NOT lift anything heavier than your baby (over 10 pounds)  · Encourage family to help participate in care of the  to allow rest and mom time to heal  · Episiotomy/Laceration  · May use ha-spray bottle, witch hazel pads and dermaplast spray for comfort  · Use ha-spray bottle after urinating to cleanse perineal area  · To  prevent burning during urination spray ha-water bottle on labial area   · Pat perineal area dry until episiotomy/laceration is healed  · Continue to use ha-bottle until bleeding stops as needed  · If have a 2nd degree laceration or greater, a Sitz bath can offer relief from soreness, burning, and inflammation   · Sitz Bath   · Sit in 6 inches of warm water and soak laceration as needed until the laceration heals    VAGINAL CARE AND BLEEDING  · Nothing inside vagina for 6 weeks:   · No sexual intercourse, tampons or douching  · Bleeding may continue for 2-4 weeks. Amount and color may vary  · Soaking 1 pad or more in an hour for several hours is considered heavy bleeding  · Passing large egg sized blood clots can be concerning  · If you feel like you have heavy bleeding or are having increasing amount of blood clots call your Obstetrician immediately  · If you begin feeling faint upon standing, feeling sick to your stomach, have clammy skin, a really fast heartbeat, have chills, start feeling confused, dizzy, sleepy or weak, or feeling like you're going to faint call your Obstetrician immediately    HYPERTENSION   Preeclampsia or gestational hypertension are types of high blood pressure that only pregnant women can get. It is important for you to be aware of symptoms to seek early intervention and treatment. If you have any of these symptoms immediately call your Obstetrician    · Vision changes or blurred vision   · Severe headache or pain that is unrelieved with medication and will not go away  · Persistent pain in upper abdomen or shoulder   · Increased swelling of face, feet, or hands  · Difficulty breathing or shortness of breath at rest  · Urinating less than usual    URINATION AND BOWEL MOVEMENTS  · Eating more fiber (bran cereal, fruits, and vegetables) and drinking plenty of fluids will help to avoid constipation  · Urinary frequency and urgency after childbirth is normal  · If you experience any urinary  "pain, burning or frequency call your provider    BIRTH CONTROL  · It is possible to become pregnant at any time after delivery and while breastfeeding  · Plan to discuss a method of birth control with your physician at your post delivery follow up visit    POSTPARTUM BLUES  During the first few days after birth, you may experience a sense of the \"blues\" which may include impatience, irritability or even crying. These feelings come and go quickly. However, as many as 1 in 10 women experience emotional symptoms known as postpartum depression.     POSTPARTUM DEPRESSION    May start as early as the second or third day after delivery or take several weeks or months to develop. Symptoms of \"blues\" are present, but are more intense: Crying spells; loss of appetite; feelings of hopelessness or loss of control; fear of touching the baby; over concern or no concern at all about the baby; little or no concern about your own appearance/caring for yourself; and/or inability to sleep or excessive sleeping. Contact your Obstetrician if you are experiencing any of these symptoms     PREVENTING SHAKEN BABY  If you are angry or stressed, PUT THE BABY IN THE CRIB, step away, take some deep breaths, and wait until you are calm to care for the baby. DO NOT SHAKE THE BABY. You are not alone, call a supporter for help.  · [facility] 24/7 crisis call line (466-964-8695) or (1-346.473.3545)  · You can also text them, text \"ANSWER\" (655883)      "

## 2021-11-16 NOTE — PROGRESS NOTES
0700-Bedside report received from Franci Marrero RN. Assumed care of patient. Patient denies any needs at this time.  1245-Assessment completed.  Patient progressing according to plan of care.  Patient encouraged to call for any needs.  Discussed pain management. Patient denies any pain at this time.  Bonding with infant

## 2021-11-16 NOTE — DISCHARGE PLANNING
Meds-to-Beds: Discharge prescription orders listed below delivered to patient's bedside. RN Shellie notified. Patient counseled.      Current Outpatient Medications   Medication Sig Dispense Refill   • ibuprofen (MOTRIN) 800 MG Tab Take 1 Tablet by mouth every 8 hours as needed. 60 Tablet 0   • acetaminophen (TYLENOL) 500 MG Tab Take 2 Tablets by mouth every 8 hours as needed. 60 Tablet 0   • docusate sodium 100 MG Cap Take 1 capsule by mouth 2 times a day as needed for Constipation. 60 Capsule 0      Cony Croft, PharmD

## 2021-11-16 NOTE — DISCHARGE SUMMARY
Discharge Summary:     Date of Admission: 2021  Date of Discharge: 21      Admitting diagnosis:    1. Pregnancy @ 36w3d      Discharge Diagnosis:   1. Status post vaginal, spontaneous.    Past Medical History:   Diagnosis Date   • Anxiety    • Asthma     as a child   • Borderline personality disorder (HCC)    • Depression    • Heart murmur     at age 5or 6    • Rh negative status during pregnancy 2021   • Severe preeclampsia, third trimester 2021     OB History    Para Term  AB Living   4 3 2 1 1 3   SAB IAB Ectopic Molar Multiple Live Births   1       0 3      # Outcome Date GA Lbr [place]/2nd Weight Sex Delivery Anes PTL Lv   4  21 36w3d 04:56 / 00:16 2.445 kg (5 lb 6.2 oz) M Vag-Spont EPI N THI      Complications: Methadone use disorder, moderate, on maintenance therapy (HCC)   3 Term 17 38w0d  2.96 kg (6 lb 8.4 oz) F Vag-Spont   THI   2 Term 06 40w1d  3.175 kg (7 lb) F Vag-Spont EPI N THI      Birth Comments: meconium    1 SAB               Obstetric Comments   Pregnancy unplanned but desired. FOB not involved. Pt does not work outside of home at this time.      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 now   who presented for IOL due to preeclampsia.  The patient was placed on magnesium protocol and was treated for GBS prophylaxis.  Her PIH labs were monitored she underwent continuous fetal heart tones and toco monitoring.  The patient delivered a viable male vaginal spontaneous delivery under epidural anesthesia.  Patient was monitored in postpartum and was discharged when deemed appropriate    Today, the patient reported she wanted to undergo a tubal ligation as her preferred form of birth control.  She reported 2 out of 10 generalized abdominal cramping.  She reported changing her pads 3 times secondary to vaginal bleeding.  She said that her pads were less than 50% soaked when changed.  She reported good p.o. intake, good voiding, and good ambulation.  The patient reported having a bowel movement and passing gas normally.  The patient will be bottle and breast-feeding as the baby had a poor latch.  The patient is able to pump well.  she is working with the lactation consultant to improve the baby's latch.      Physical Exam:  Temp:  [36.6 °C (97.8 °F)-37 °C (98.6 °F)] 36.6 °C (97.8 °F)  Pulse:  [65-75] 65  Resp:  [19-20] 19  BP: (112-166)/(55-87) 132/55  SpO2:  [95 %-100 %] 100 %  Physical Exam  General: well  Fundus: firm  Incision: not applicable, (vaginal delivery)  Perineum: deferred  Extremities: trace edema on R, no edema on L, calves nontender    Current Facility-Administered Medications   Medication Dose   • oxytocin (PITOCIN) infusion (for postpartum)   mL/hr   • acetaminophen (TYLENOL) tablet 1,000 mg  1,000 mg   • LR infusion     • prenatal plus vitamin (STUARTNATAL 1+1) 27-1 MG tablet 1 Tablet  1 Tablet   • ondansetron (ZOFRAN) syringe/vial injection 4 mg  4 mg   • docusate sodium (COLACE) capsule 100 mg  100 mg   • bisacodyl (DULCOLAX) suppository 10 mg  10 mg   • ibuprofen (MOTRIN) tablet 800 mg  800 mg   • ondansetron (ZOFRAN ODT) dispertab 4 mg  4 mg   • lactated ringers infusion     •  methadone (DOLOPHINE) tablet 120 mg  120 mg   • methadone (DOLOPHINE) tablet 5 mg  5 mg   • methadone (DOLOPHINE) tablet 20 mg  20 mg   • nicotine (NICODERM) 14 MG/24HR 14 mg  14 mg    And   • nicotine polacrilex (NICORETTE) 2 MG piece 2 mg  2 mg       Recent Labs     11/14/21  1352   WBC 10.4   RBC 3.29*   HEMOGLOBIN 10.7*   HEMATOCRIT 31.5*   MCV 95.7   MCH 32.5   MCHC 34.0   RDW 45.4   PLATELETCT 123*   MPV 12.3         Activity/ Discharge Instructions::   Discharge to home  Pelvic Rest x 6 weeks  No heavy lifting x4 weeks  Call or come to ED for: heavy vaginal bleeding, fever >100.4, severe abdominal pain, severe headache, chest pain, shortness of breath,  N/V, incisional drainage, or other concerns.       Follow up:  [facility] in 5 weeks for vaginal delivery  Discharge Meds:   Current Outpatient Medications   Medication Sig Dispense Refill   • ibuprofen (MOTRIN) 800 MG Tab Take 1 Tablet by mouth every 8 hours as needed. 60 Tablet 0   • acetaminophen (TYLENOL) 500 MG Tab Take 2 Tablets by mouth every 8 hours as needed. 60 Tablet 0   • docusate sodium 100 MG Cap Take 1 capsule by mouth 2 times a day as needed for Constipation. 60 Capsule 0       Ciara Rajan M.D.

## 2021-11-16 NOTE — CARE PLAN
The patient is Stable - Low risk of patient condition declining or worsening    Shift Goals  Clinical Goals: Patient will maintain stable VS; Lochia WDL; Pain WDL    Progress made toward(s) clinical / shift goals:  **  Problem: Risk for Excess Fluid Volume  Goal: Patient will demonstrate pulse, blood pressure and neurologic signs within expected ranges and without any respiratory complications  Outcome: Progressing     Problem: Pain - Standard  Goal: Alleviation of pain or a reduction in pain to the patient’s comfort goal  Outcome: Progressing     Problem: Knowledge Deficit - Standard  Goal: Patient and family/care givers will demonstrate understanding of plan of care, disease process/condition, diagnostic tests and medications  Outcome: Progressing     Problem: Knowledge Deficit - Postpartum  Goal: Patient will verbalize and demonstrate understanding of self and infant care  Outcome: Progressing     Problem: Psychosocial - Postpartum  Goal: Patient will verbalize and demonstrate effective bonding and parenting behavior  Outcome: Progressing     Problem: Altered Physiologic Condition  Goal: Patient physiologically stable as evidenced by normal lochia, palpable uterine involution and vitals within normal limits  Outcome: Progressing     Problem: Infection - Postpartum  Goal: Postpartum patient will be free of signs and symptoms of infection  Outcome: Progressing   *

## 2021-11-16 NOTE — PROGRESS NOTES
Stable mom.  Ambulated in hallway.  Pleasant.  Tolerated well.  Bonding well with infant.  Engaged in infant's feeding plan.  Discussed POC for day.  Verbalizes understanding and will discuss rooming in after discharge with SO.

## 2021-11-16 NOTE — CARE PLAN
The patient is Stable - Low risk of patient condition declining or worsening    Shift Goals  Clinical Goals: Patient will maintain blood pressure less than 160/110    Progress made toward(s) clinical / shift goals:  Patient maintained blood pressure within defined parameters      Problem: Risk for Excess Fluid Volume  Goal: Patient will demonstrate pulse, blood pressure and neurologic signs within expected ranges and without any respiratory complications  Outcome: Progressing     Problem: Pain - Standard  Goal: Alleviation of pain or a reduction in pain to the patient’s comfort goal  Outcome: Progressing     Problem: Knowledge Deficit - Standard  Goal: Patient and family/care givers will demonstrate understanding of plan of care, disease process/condition, diagnostic tests and medications  Outcome: Progressing     Problem: Knowledge Deficit - Postpartum  Goal: Patient will verbalize and demonstrate understanding of self and infant care  Outcome: Progressing     Problem: Psychosocial - Postpartum  Goal: Patient will verbalize and demonstrate effective bonding and parenting behavior  Outcome: Progressing     Problem: Altered Physiologic Condition  Goal: Patient physiologically stable as evidenced by normal lochia, palpable uterine involution and vitals within normal limits  Outcome: Progressing     Problem: Infection - Postpartum  Goal: Postpartum patient will be free of signs and symptoms of infection  Outcome: Progressing

## 2021-11-29 ENCOUNTER — TELEPHONE (OUTPATIENT)
Dept: OBGYN | Facility: CLINIC | Age: 36
End: 2021-11-29

## 2021-11-29 NOTE — TELEPHONE ENCOUNTER
Andreia from [facility] called requesting appt information on pt, information was given of last appt and future PP visit appt scheduled time and date. Andreia requested a copy of pt's delivery note, informed Andreia she will have to contact medical records and phone number provided

## 2022-06-06 ENCOUNTER — APPOINTMENT (OUTPATIENT)
Dept: MEDICAL GROUP | Facility: PHYSICIAN GROUP | Age: 37
End: 2022-06-06
Payer: MEDICAID

## 2022-09-07 ENCOUNTER — HOSPITAL ENCOUNTER (EMERGENCY)
Facility: MEDICAL CENTER | Age: 37
End: 2022-09-07
Attending: EMERGENCY MEDICINE
Payer: MEDICAID

## 2022-09-07 VITALS
HEART RATE: 78 BPM | TEMPERATURE: 98.4 F | OXYGEN SATURATION: 100 % | HEIGHT: 67 IN | SYSTOLIC BLOOD PRESSURE: 143 MMHG | RESPIRATION RATE: 16 BRPM | DIASTOLIC BLOOD PRESSURE: 78 MMHG | WEIGHT: 198.41 LBS | BODY MASS INDEX: 31.14 KG/M2

## 2022-09-07 DIAGNOSIS — B97.89 VIRAL RESPIRATORY ILLNESS: ICD-10-CM

## 2022-09-07 DIAGNOSIS — J98.8 VIRAL RESPIRATORY ILLNESS: ICD-10-CM

## 2022-09-07 PROCEDURE — U0003 INFECTIOUS AGENT DETECTION BY NUCLEIC ACID (DNA OR RNA); SEVERE ACUTE RESPIRATORY SYNDROME CORONAVIRUS 2 (SARS-COV-2) (CORONAVIRUS DISEASE [COVID-19]), AMPLIFIED PROBE TECHNIQUE, MAKING USE OF HIGH THROUGHPUT TECHNOLOGIES AS DESCRIBED BY CMS-2020-01-R: HCPCS

## 2022-09-07 PROCEDURE — 99283 EMERGENCY DEPT VISIT LOW MDM: CPT

## 2022-09-07 PROCEDURE — U0005 INFEC AGEN DETEC AMPLI PROBE: HCPCS

## 2022-09-07 ASSESSMENT — FIBROSIS 4 INDEX: FIB4 SCORE: 1.463414634146341463

## 2022-09-07 NOTE — ED PROVIDER NOTES
ED Provider Note    CHIEF COMPLAINT  Chief Complaint   Patient presents with    Sore Throat     Started this AM     Ear Pain     Bilateral ears, c/o headache       HPI  Minnie Sherman is a 36 y.o. female who presents with sore throat and bilateral ear pain and mild headaches since this morning.  Notes that she has multiple sick contacts at home including a daughter who came home with cough and symptoms consistent with a viral URI.  Both her son and  are also here with similar symptoms though their symptoms started on Saturday.  Patient denies any significant underlying medical conditions.  No active fevers upon arrival.  No chest pain or trouble breathing.  No abdominal pain, nausea, vomiting.    REVIEW OF SYSTEMS  See HPI for further details. All other systems are negative.     PAST MEDICAL HISTORY   has a past medical history of Anxiety, Asthma, Borderline personality disorder (HCC) (), Depression, Heart murmur, Rh negative status during pregnancy (2021), and Severe preeclampsia, third trimester (2021).    SOCIAL HISTORY  Social History     Tobacco Use    Smoking status: Former     Packs/day: 0.25     Years: 18.00     Pack years: 4.50     Types: Cigarettes     Quit date: 2021     Years since quittin.4    Smokeless tobacco: Never    Tobacco comments:     using nicotine lozenges to quit   Vaping Use    Vaping Use: Never used   Substance and Sexual Activity    Alcohol use: No    Drug use: Not Currently     Types: Marijuana, Methamphetamines, Cocaine, IV, Intravenous     Comment: pt states that she last used heroin 2021    Sexual activity: Yes     Partners: Male     Comment: Unplanned pregnancy       SURGICAL HISTORY   has a past surgical history that includes cholangiograms (N/A, 2016) and richard by laparoscopy (N/A, 2016).    CURRENT MEDICATIONS  Home Medications       Reviewed by Noy Hutchins R.N. (Registered Nurse) on 22 at 1311  Med List Status: Not  "Addressed     Medication Last Dose Status   acetaminophen (TYLENOL) 500 MG Tab  Active   docusate sodium 100 MG Cap  Active   ibuprofen (MOTRIN) 800 MG Tab  Active   methadone (DOLOPHINE) 5 MG Tab  Active   nicotine polacrilex (HM NICOTINE POLACRILEX) 4 MG lozenge  Active   Prenatal Vit-Fe Sulfate-FA-DHA (PRENATAL VITAMIN/MIN +DHA) 27-0.8-200 MG Cap  Active                    ALLERGIES  Allergies   Allergen Reactions    Aspirin Hives     Reaction is to high dose aspiring,  Tolerated Toradol 04/04/16, patient states can tolerate Ibuprofen       PHYSICAL EXAM  VITAL SIGNS: BP (!) 144/79   Pulse 77   Temp 36.8 °C (98.3 °F) (Temporal)   Resp 16   Ht 1.702 m (5' 7\")   Wt 90 kg (198 lb 6.6 oz)   SpO2 98%   BMI 31.08 kg/m²   Pulse ox interpretation: I interpret this pulse ox as normal.  Constitutional: Alert in no apparent distress.  HENT: No signs of trauma, Bilateral external ears normal, Nose normal.  Bilateral tympanic membranes are normal.  Posterior pharynx is unremarkable.  Eyes: Pupils are equal and reactive, Conjunctiva normal, Non-icteric.   Neck: Normal range of motion, No tenderness, Supple, No stridor.   Lymphatic: No lymphadenopathy noted.   Cardiovascular: Regular rate and rhythm.   Thorax & Lungs: Normal breath sounds, No respiratory distress, No wheezing, No chest tenderness.   Abdomen: Soft, No tenderness, No masses, No pulsatile masses. No peritoneal signs.  Skin: Warm, Dry, No erythema, No rash.   Extremities: Intact distal pulses, No edema, No cyanosis  Musculoskeletal: Good range of motion in all major joints. No tenderness to palpation or major deformities noted.   Neurologic: Alert, No focal deficits noted.       DIAGNOSTIC STUDIES / PROCEDURES      LABS  Labs Reviewed   SARS-COV-2, PCR (IN-HOUSE)         COURSE & MEDICAL DECISION MAKING    Medications - No data to display    Pertinent Labs & Imaging studies reviewed. (See chart for details)  36 y.o. female presenting with sore throat and ear " "pain and mild headache.  Normal vital signs upon arrival.  No fever, tachycardia.  Symptoms started after exposure to multiple sick contacts with similar symptoms.  She is also accompanied by a son and  who have similar symptoms.  She was tested for COVID.  Testing would not yield the results until tomorrow.  Recommending following up on the results and supportive care measures at home.  No obvious signs of a serious bacterial illness such as pneumonia or otitis media or strep throat.  Symptoms are most consistent with a viral respiratory illness such as COVID.        The patient was instructed to follow-up with primary care physician for further management.  To return immediately for any worsening symptoms or development of any other concerning signs or symptoms. The patient verbalizes understanding in their own words.    BP (!) 143/78   Pulse 78   Temp 36.9 °C (98.4 °F) (Temporal)   Resp 16   Ht 1.702 m (5' 7\")   Wt 90 kg (198 lb 6.6 oz)   SpO2 100%   BMI 31.08 kg/m²     The patient was referred to primary care where they will receive further BP management.      [facility], Emergency Dept  [address]  [place] [id number]  [phone]    As needed, If symptoms worsen    Primary care doctor    Schedule an appointment as soon as possible for a visit       FINAL IMPRESSION  1. Viral respiratory illness            Electronically signed by: Best Olivares M.D., 9/7/2022 1:24 PM    "

## 2022-09-08 LAB
SARS-COV-2 RNA RESP QL NAA+PROBE: NOTDETECTED
SPECIMEN SOURCE: NORMAL

## 2025-06-19 ENCOUNTER — HOSPITAL ENCOUNTER (EMERGENCY)
Facility: MEDICAL CENTER | Age: 40
End: 2025-06-19
Attending: STUDENT IN AN ORGANIZED HEALTH CARE EDUCATION/TRAINING PROGRAM
Payer: MEDICAID

## 2025-06-19 ENCOUNTER — APPOINTMENT (OUTPATIENT)
Dept: RADIOLOGY | Facility: MEDICAL CENTER | Age: 40
End: 2025-06-19
Attending: STUDENT IN AN ORGANIZED HEALTH CARE EDUCATION/TRAINING PROGRAM
Payer: MEDICAID

## 2025-06-19 VITALS
RESPIRATION RATE: 16 BRPM | DIASTOLIC BLOOD PRESSURE: 80 MMHG | TEMPERATURE: 97.9 F | BODY MASS INDEX: 27.47 KG/M2 | HEIGHT: 67 IN | OXYGEN SATURATION: 95 % | SYSTOLIC BLOOD PRESSURE: 139 MMHG | WEIGHT: 175 LBS | HEART RATE: 76 BPM

## 2025-06-19 DIAGNOSIS — S92.501A CLOSED FRACTURE OF PHALANX OF RIGHT FIFTH TOE, INITIAL ENCOUNTER: Primary | ICD-10-CM

## 2025-06-19 PROCEDURE — A9270 NON-COVERED ITEM OR SERVICE: HCPCS | Performed by: STUDENT IN AN ORGANIZED HEALTH CARE EDUCATION/TRAINING PROGRAM

## 2025-06-19 PROCEDURE — 99284 EMERGENCY DEPT VISIT MOD MDM: CPT

## 2025-06-19 PROCEDURE — 73080 X-RAY EXAM OF ELBOW: CPT | Mod: LT

## 2025-06-19 PROCEDURE — 73630 X-RAY EXAM OF FOOT: CPT | Mod: RT

## 2025-06-19 PROCEDURE — 700102 HCHG RX REV CODE 250 W/ 637 OVERRIDE(OP): Performed by: STUDENT IN AN ORGANIZED HEALTH CARE EDUCATION/TRAINING PROGRAM

## 2025-06-19 PROCEDURE — 700111 HCHG RX REV CODE 636 W/ 250 OVERRIDE (IP): Performed by: STUDENT IN AN ORGANIZED HEALTH CARE EDUCATION/TRAINING PROGRAM

## 2025-06-19 RX ORDER — HYDROCODONE BITARTRATE AND ACETAMINOPHEN 5; 325 MG/1; MG/1
1 TABLET ORAL ONCE
Refills: 0 | Status: COMPLETED | OUTPATIENT
Start: 2025-06-19 | End: 2025-06-19

## 2025-06-19 RX ORDER — LEVOTHYROXINE SODIUM 112 UG/1
115 TABLET ORAL
COMMUNITY

## 2025-06-19 RX ORDER — ONDANSETRON 4 MG/1
4 TABLET, ORALLY DISINTEGRATING ORAL ONCE
Status: COMPLETED | OUTPATIENT
Start: 2025-06-19 | End: 2025-06-19

## 2025-06-19 RX ADMIN — HYDROCODONE BITARTRATE AND ACETAMINOPHEN 1 TABLET: 5; 325 TABLET ORAL at 03:49

## 2025-06-19 RX ADMIN — ONDANSETRON 4 MG: 4 TABLET, ORALLY DISINTEGRATING ORAL at 03:48

## 2025-06-19 NOTE — ED NOTES
Pt given discharge instructions; verbalized understanding of instructions.  Ambulated out on crutches.  Being picked up by family

## 2025-06-19 NOTE — ED NOTES
Pt now requesting a prescription for antibiotics for a tooth abscess.  ERP notified, back in room for recheck.

## 2025-06-19 NOTE — ED PROVIDER NOTES
ED Provider Note    CHIEF COMPLAINT  Chief Complaint   Patient presents with    Digit Pain     Pt kicked stroller 14 hours pta now comes in via ambulance for 4/10 pain on right 4th and 5th toes. Pt also c/o numbness to area -some bruising note.       EXTERNAL RECORDS REVIEWED  Previous ED visits from 2022 where patient was seen for viral upper respiratory illness, previous admissions from 2021 where patient was seen for postpartum    HPI/ROS  LIMITATION TO HISTORY   Select: : None  OUTSIDE HISTORIAN(S):  none    Minnie Sherman is a 39 y.o. female who presents to the emergency department chief complaint of right foot and toe pain as well as left elbow pain.  Patient states that she was putting together a stroller, jumped over it, struck her right foot experiencing immediate pain over the right fifth digit and then developed some bruising.  This event was earlier today.  She states that afterwards she fell and also struck her left elbow, has been having some mild elbow pain.  Denies any associated numbness or tingling, denies any lacerations, did not hit her head or lose consciousness.  Patient took some Tylenol and ibuprofen at home without significant improvement.    PAST MEDICAL HISTORY   has a past medical history of Anxiety, Asthma, Borderline personality disorder (HCC) (2010), Depression, Heart murmur, Rh negative status during pregnancy (7/9/2021), and Severe preeclampsia, third trimester (11/14/2021).    SURGICAL HISTORY   has a past surgical history that includes cholangiograms (N/A, 4/5/2016) and richard by laparoscopy (N/A, 4/5/2016).    FAMILY HISTORY  Family History   Problem Relation Age of Onset    Heart Disease Mother     Heart Disease Father     Diabetes Paternal Grandmother     Heart Disease Paternal Grandmother        SOCIAL HISTORY  Social History     Tobacco Use    Smoking status: Former     Current packs/day: 0.00     Average packs/day: 0.3 packs/day for 18.0 years (4.5 ttl pk-yrs)     Types:  Cigarettes     Start date: 2003     Quit date: 2021     Years since quittin.1    Smokeless tobacco: Never    Tobacco comments:     using nicotine lozenges to quit   Vaping Use    Vaping status: Never Used   Substance and Sexual Activity    Alcohol use: No    Drug use: Not Currently     Types: Marijuana, Methamphetamines, Cocaine, IV, Intravenous     Comment: pt states that she last used heroin 2021    Sexual activity: Yes     Partners: Male     Comment: Unplanned pregnancy       CURRENT MEDICATIONS  Home Medications    **Home medications have not yet been reviewed for this encounter**         ALLERGIES  Allergies[1]    PHYSICAL EXAM  VITAL SIGNS: There were no vitals taken for this visit.   Constitutional: Awake and alert  HENT: Normal inspection  Eyes: Normal inspection  Neck: Grossly normal range of motion.  Cardiovascular: Normal heart rate, Normal rhythm.  Symmetric peripheral pulses.   Thorax & Lungs: No respiratory distress, No wheezing, No rales, No rhonchi, No chest tenderness.   Abdomen: Bowel sounds normal, soft, non-distended, nontender, no mass  Skin: No laceration  Back: No tenderness, No CVA tenderness.   Extremities: Patient has mild tenderness over the medial aspect of her left elbow, intact range of motion, distal CMS is intact.  Patient has no tenderness palpation over the right medial or lateral malleolus, does have some bruising and tenderness to palpation over the distal right 4th and 5th metatarsals, able to move all 4 digits of right lower extremity, intact sensation, intact capillary refill   neurologic: Grossly normal   Psychiatric: Normal for situation    RADIOLOGY/PROCEDURES   I have independently interpreted the diagnostic imaging associated with this visit and am waiting the final reading from the radiologist.   My preliminary interpretation is as follows: I disagree with the below interpretation, I see a small nondisplaced fracture of the proximal phalanx of the right  fifth toe    Radiologist interpretation:  DX-FOOT-COMPLETE 3+ RIGHT   Final Result         1.  No acute traumatic bony injury.      DX-ELBOW-COMPLETE 3+ LEFT   Final Result         1.  No acute traumatic bony injury.           SPLINT PLACEMENT:  The patient was placed in a short walking boot and the splint was applied by ED tech. Following splint application I rechecked the position and circulation and neuro function. The splint was in good position with good neurovascular function. The right foot and ankle was well immobilized.      COURSE & MEDICAL DECISION MAKING    ASSESSMENT, COURSE AND PLAN  Care Narrative: Patient is a 39-year-old female presenting to the emergency department with right toe, foot pain as well as left elbow pain after striking her foot against a stroller, falling and striking her left elbow.  No evidence of any open lacerations.  Patient does have bruising swelling and tenderness palpation over the distal portion of her right 5th and 4th metatarsals, is neurovascularly intact, does have some mild left elbow pain.  Will obtain plain films to rule out acute traumatic injuries.  Will give pain medications here.    Elbow x-ray is normal.  Foot x-ray shows a likely small nondisplaced fracture of the proximal phalanx of the right fifth digit which correlates with the patient's pain.     States that she has not been able to walk on it secondary to pain, will be provided with a walking boot as well as crutches.  She will follow-up with orthopedics, recommended over-the-counter medications, andre taping.  Differential diagnosis considered.  Suspect likely nondisplaced right fifth phalanx fracture, doubt any other acute traumatic injuries.  Patient will be discharged home to care of self with follow-up with orthopedics, return for any new or worsening symptoms.            ADDITIONAL PROBLEMS MANAGED  None    DISPOSITION AND DISCUSSIONS  I have discussed management of the patient with the following  physicians and HEATHER's: None    Discussion of management with other QHP or appropriate source(s): None     Escalation of care considered, and ultimately not performed:diagnostic imaging    Barriers to care at this time, including but not limited to: Patient does not have established PCP.       FINAL DIAGNOSIS  1. Closed fracture of phalanx of right fifth toe, initial encounter Acute        Electronically signed by: Sumit De Los Santos M.D., 6/19/2025 3:33 AM           [1]   Allergies  Allergen Reactions    Aspirin Hives     Reaction is to high dose aspiring,  Tolerated Toradol 04/04/16, patient states can tolerate Ibuprofen

## 2025-06-19 NOTE — ED NOTES
Patient fitted for splint per ERP orders. Neurovascular checks intact distal to splint application. Patient fitted for crutches per ERP orders. Crutch use explained with return demo by patient.

## 2025-06-19 NOTE — ED NOTES
Pt now also c/o left elbow pain, she fell a couple months ago and it's still bothering her.  Xray ordered ;by ERP

## 2025-06-19 NOTE — ED TRIAGE NOTES
"Pt bib ems with c/o    Chief Complaint   Patient presents with    Digit Pain     Pt kicked stroller 14 hours pta now comes in via ambulance for 4/10 pain on right 4th and 5th toes. Pt also c/o numbness to area -some bruising note.       /80   Pulse 80   Temp 36.6 °C (97.9 °F) (Temporal)   Resp 16   Ht 1.702 m (5' 7\")   Wt 79.4 kg (175 lb)   SpO2 95%   BMI 27.41 kg/m²   "

## 2025-06-19 NOTE — DISCHARGE INSTRUCTIONS
You were seen and evaluated in the emergency department for your toe and elbow pain.  Thankfully your elbow x-ray did not show any evidence of an acute fracture.  Unfortunately, your x-ray of your right foot showed that you have a fracture of your right fifth toe.  I do recommend andre taping it to the toe next to it, we have provided you with a hard soled shoe.  Please follow-up with the above specialist, your primary care doctor or the above resources.  You can take anti-inflammatories, Tylenol at home, you can use ice for any swelling, bruising, inflammation or pain.  This will take approximately 6 weeks or longer to heal completely.